# Patient Record
Sex: FEMALE | Race: WHITE | NOT HISPANIC OR LATINO | URBAN - METROPOLITAN AREA
[De-identification: names, ages, dates, MRNs, and addresses within clinical notes are randomized per-mention and may not be internally consistent; named-entity substitution may affect disease eponyms.]

---

## 2018-11-19 ENCOUNTER — HOSPITAL ENCOUNTER (OUTPATIENT)
Dept: RADIOLOGY | Facility: HOSPITAL | Age: 58
Discharge: HOME | End: 2018-11-19
Attending: INTERNAL MEDICINE
Payer: MEDICARE

## 2018-11-19 DIAGNOSIS — K50.90 CROHN'S DISEASE: ICD-10-CM

## 2018-11-19 PROCEDURE — 72197 MRI PELVIS W/O & W/DYE: CPT | Mod: TC

## 2018-11-19 PROCEDURE — 25500020 PHARM REV CODE 255

## 2018-11-19 PROCEDURE — 74183 MRI ABD W/O CNTR FLWD CNTR: CPT | Mod: 26,,, | Performed by: RADIOLOGY

## 2018-11-19 PROCEDURE — 72197 MRI PELVIS W/O & W/DYE: CPT | Mod: 26,,, | Performed by: RADIOLOGY

## 2018-11-19 PROCEDURE — A9585 GADOBUTROL INJECTION: HCPCS

## 2018-11-19 RX ORDER — GADOBUTROL 604.72 MG/ML
10 INJECTION INTRAVENOUS
Status: COMPLETED | OUTPATIENT
Start: 2018-11-19 | End: 2018-11-19

## 2018-11-19 RX ADMIN — GADOBUTROL 10 ML: 604.72 INJECTION INTRAVENOUS at 11:11

## 2025-02-02 ENCOUNTER — HOSPITAL ENCOUNTER (INPATIENT)
Facility: HOSPITAL | Age: 65
LOS: 9 days | Discharge: HOME | DRG: 885 | End: 2025-02-11
Attending: PSYCHIATRY & NEUROLOGY | Admitting: PSYCHIATRY & NEUROLOGY
Payer: MEDICARE

## 2025-02-02 ENCOUNTER — HOSPITAL ENCOUNTER (EMERGENCY)
Facility: HOSPITAL | Age: 65
Discharge: PSYCHIATRIC OR SUBSTANCE USE FACILITY | End: 2025-02-02
Attending: STUDENT IN AN ORGANIZED HEALTH CARE EDUCATION/TRAINING PROGRAM
Payer: MEDICARE

## 2025-02-02 VITALS
RESPIRATION RATE: 18 BRPM | HEIGHT: 64 IN | DIASTOLIC BLOOD PRESSURE: 57 MMHG | BODY MASS INDEX: 27.77 KG/M2 | SYSTOLIC BLOOD PRESSURE: 120 MMHG | WEIGHT: 162.69 LBS | HEART RATE: 85 BPM | OXYGEN SATURATION: 95 % | TEMPERATURE: 98 F

## 2025-02-02 DIAGNOSIS — Z00.8 EVALUATION BY PSYCHIATRIC SERVICE REQUIRED: Primary | ICD-10-CM

## 2025-02-02 DIAGNOSIS — F29 PSYCHOSIS: Primary | ICD-10-CM

## 2025-02-02 LAB
ALBUMIN SERPL BCP-MCNC: 4.4 G/DL (ref 3.5–5.2)
ALP SERPL-CCNC: 95 U/L (ref 40–150)
ALT SERPL W/O P-5'-P-CCNC: 15 U/L (ref 10–44)
AMPHET+METHAMPHET UR QL: NEGATIVE
ANION GAP SERPL CALC-SCNC: 13 MMOL/L (ref 8–16)
APAP SERPL-MCNC: 11.5 UG/ML (ref 10–20)
AST SERPL-CCNC: 20 U/L (ref 10–40)
BACTERIA #/AREA URNS HPF: ABNORMAL /HPF
BARBITURATES UR QL SCN>200 NG/ML: NEGATIVE
BASOPHILS # BLD AUTO: 0.05 K/UL (ref 0–0.2)
BASOPHILS NFR BLD: 0.5 % (ref 0–1.9)
BENZODIAZ UR QL SCN>200 NG/ML: NEGATIVE
BILIRUB SERPL-MCNC: 1.4 MG/DL (ref 0.1–1)
BILIRUB UR QL STRIP: ABNORMAL
BUN SERPL-MCNC: 17 MG/DL (ref 8–23)
BZE UR QL SCN: NEGATIVE
CALCIUM SERPL-MCNC: 10.5 MG/DL (ref 8.7–10.5)
CANNABINOIDS UR QL SCN: ABNORMAL
CHLORIDE SERPL-SCNC: 106 MMOL/L (ref 95–110)
CLARITY UR: CLEAR
CO2 SERPL-SCNC: 18 MMOL/L (ref 23–29)
COLOR UR: YELLOW
CREAT SERPL-MCNC: 1 MG/DL (ref 0.5–1.4)
CREAT UR-MCNC: 140.4 MG/DL (ref 15–325)
DIFFERENTIAL METHOD BLD: ABNORMAL
EOSINOPHIL # BLD AUTO: 0.1 K/UL (ref 0–0.5)
EOSINOPHIL NFR BLD: 1.1 % (ref 0–8)
ERYTHROCYTE [DISTWIDTH] IN BLOOD BY AUTOMATED COUNT: 13 % (ref 11.5–14.5)
EST. GFR  (NO RACE VARIABLE): >60 ML/MIN/1.73 M^2
ETHANOL SERPL-MCNC: <10 MG/DL
GLUCOSE SERPL-MCNC: 108 MG/DL (ref 70–110)
GLUCOSE UR QL STRIP: NEGATIVE
HCT VFR BLD AUTO: 41.8 % (ref 37–48.5)
HGB BLD-MCNC: 14.4 G/DL (ref 12–16)
HGB UR QL STRIP: NEGATIVE
HYALINE CASTS #/AREA URNS LPF: 25 /LPF
IMM GRANULOCYTES # BLD AUTO: 0.04 K/UL (ref 0–0.04)
IMM GRANULOCYTES NFR BLD AUTO: 0.4 % (ref 0–0.5)
KETONES UR QL STRIP: ABNORMAL
LEUKOCYTE ESTERASE UR QL STRIP: ABNORMAL
LYMPHOCYTES # BLD AUTO: 2.9 K/UL (ref 1–4.8)
LYMPHOCYTES NFR BLD: 26.8 % (ref 18–48)
MCH RBC QN AUTO: 33 PG (ref 27–31)
MCHC RBC AUTO-ENTMCNC: 34.4 G/DL (ref 32–36)
MCV RBC AUTO: 96 FL (ref 82–98)
METHADONE UR QL SCN>300 NG/ML: NEGATIVE
MICROSCOPIC COMMENT: ABNORMAL
MONOCYTES # BLD AUTO: 0.6 K/UL (ref 0.3–1)
MONOCYTES NFR BLD: 5.8 % (ref 4–15)
NEUTROPHILS # BLD AUTO: 7.1 K/UL (ref 1.8–7.7)
NEUTROPHILS NFR BLD: 65.4 % (ref 38–73)
NITRITE UR QL STRIP: NEGATIVE
NRBC BLD-RTO: 0 /100 WBC
OPIATES UR QL SCN: NEGATIVE
PCP UR QL SCN>25 NG/ML: NEGATIVE
PH UR STRIP: 6 [PH] (ref 5–8)
PLATELET # BLD AUTO: 209 K/UL (ref 150–450)
PMV BLD AUTO: 8.6 FL (ref 9.2–12.9)
POTASSIUM SERPL-SCNC: 4.1 MMOL/L (ref 3.5–5.1)
PROT SERPL-MCNC: 7.9 G/DL (ref 6–8.4)
PROT UR QL STRIP: ABNORMAL
RBC # BLD AUTO: 4.36 M/UL (ref 4–5.4)
RBC #/AREA URNS HPF: 2 /HPF (ref 0–4)
SODIUM SERPL-SCNC: 137 MMOL/L (ref 136–145)
SP GR UR STRIP: 1.02 (ref 1–1.03)
SQUAMOUS #/AREA URNS HPF: 15 /HPF
TOXICOLOGY INFORMATION: ABNORMAL
URN SPEC COLLECT METH UR: ABNORMAL
UROBILINOGEN UR STRIP-ACNC: NEGATIVE EU/DL
WBC # BLD AUTO: 10.82 K/UL (ref 3.9–12.7)
WBC #/AREA URNS HPF: 2 /HPF (ref 0–5)
YEAST URNS QL MICRO: ABNORMAL

## 2025-02-02 PROCEDURE — 85025 COMPLETE CBC W/AUTO DIFF WBC: CPT | Performed by: STUDENT IN AN ORGANIZED HEALTH CARE EDUCATION/TRAINING PROGRAM

## 2025-02-02 PROCEDURE — 82077 ASSAY SPEC XCP UR&BREATH IA: CPT | Performed by: STUDENT IN AN ORGANIZED HEALTH CARE EDUCATION/TRAINING PROGRAM

## 2025-02-02 PROCEDURE — 80307 DRUG TEST PRSMV CHEM ANLYZR: CPT | Performed by: STUDENT IN AN ORGANIZED HEALTH CARE EDUCATION/TRAINING PROGRAM

## 2025-02-02 PROCEDURE — 80053 COMPREHEN METABOLIC PANEL: CPT | Performed by: STUDENT IN AN ORGANIZED HEALTH CARE EDUCATION/TRAINING PROGRAM

## 2025-02-02 PROCEDURE — 11400000 HC PSYCH PRIVATE ROOM

## 2025-02-02 PROCEDURE — 80143 DRUG ASSAY ACETAMINOPHEN: CPT | Performed by: STUDENT IN AN ORGANIZED HEALTH CARE EDUCATION/TRAINING PROGRAM

## 2025-02-02 PROCEDURE — 81000 URINALYSIS NONAUTO W/SCOPE: CPT | Mod: 59 | Performed by: STUDENT IN AN ORGANIZED HEALTH CARE EDUCATION/TRAINING PROGRAM

## 2025-02-02 PROCEDURE — 99285 EMERGENCY DEPT VISIT HI MDM: CPT

## 2025-02-02 RX ORDER — LORAZEPAM 2 MG/ML
2 INJECTION INTRAMUSCULAR EVERY 4 HOURS PRN
Status: DISCONTINUED | OUTPATIENT
Start: 2025-02-02 | End: 2025-02-02 | Stop reason: HOSPADM

## 2025-02-02 RX ORDER — DIPHENHYDRAMINE HYDROCHLORIDE 50 MG/ML
50 INJECTION INTRAMUSCULAR; INTRAVENOUS EVERY 6 HOURS PRN
Status: DISCONTINUED | OUTPATIENT
Start: 2025-02-02 | End: 2025-02-02 | Stop reason: HOSPADM

## 2025-02-02 RX ORDER — HALOPERIDOL 5 MG/ML
5 INJECTION INTRAMUSCULAR EVERY 6 HOURS PRN
Status: DISCONTINUED | OUTPATIENT
Start: 2025-02-02 | End: 2025-02-02 | Stop reason: HOSPADM

## 2025-02-02 RX ORDER — LORAZEPAM 1 MG/1
1 TABLET ORAL
Status: DISCONTINUED | OUTPATIENT
Start: 2025-02-02 | End: 2025-02-02 | Stop reason: HOSPADM

## 2025-02-02 NOTE — ED PROVIDER NOTES
Encounter Date: 2/2/2025       History     Chief Complaint   Patient presents with    Anxiety     Pt arrives to the ed with sister in law to the ed with c/o visual hallucinations, bizarre behavior, anxiety, and insomnia x 1 month. Pt reports seeing ghost but knows they aren't real. Hx bipolar.denies  SI/HI     64-year-old female with history of bipolar, Crohn's disease, presenting requesting help with her mental health.  Patient reports severe anxiety over the last few days, reports visual hallucinations, seeing dose (but knows that they are not real), and other bizarre behavior.  No other complaints.  No suicidal homicidal ideation.      Review of patient's allergies indicates:  No Known Allergies  Past Medical History:   Diagnosis Date    Anxiety disorder, unspecified     Bipolar disorder, unspecified     Crohn disease     Hypertension      History reviewed. No pertinent surgical history.  No family history on file.  Social History     Tobacco Use    Smoking status: Every Day     Types: Cigarettes    Smokeless tobacco: Never   Substance Use Topics    Alcohol use: Never    Drug use: Yes     Types: Marijuana     Review of Systems   Constitutional:  Negative for fever.   HENT:  Negative for sore throat.    Respiratory:  Negative for shortness of breath.    Cardiovascular:  Negative for chest pain.   Gastrointestinal:  Negative for nausea.   Genitourinary:  Negative for dysuria.   Musculoskeletal:  Negative for back pain.   Skin:  Negative for rash.   Neurological:  Negative for weakness.   Hematological:  Does not bruise/bleed easily.   Psychiatric/Behavioral:  Positive for behavioral problems.        Physical Exam     Initial Vitals [02/02/25 1730]   BP Pulse Resp Temp SpO2   (!) 184/84 83 20 98.5 °F (36.9 °C) 97 %      MAP       --         Physical Exam    Nursing note and vitals reviewed.  Constitutional: She appears well-developed.   HENT:   Head: Normocephalic.   Eyes: Pupils are equal, round, and reactive to  light.   Neck:   Normal range of motion.  Cardiovascular:            No murmur heard.  Pulmonary/Chest: No respiratory distress.   Abdominal: Abdomen is soft.   Musculoskeletal:         General: No edema.      Cervical back: Normal range of motion.     Neurological: She is alert.   Skin: Skin is warm.   Psychiatric:   No suicidal homicidal ideation.  Patient is having visual hallucinations, and bizarre behavior         ED Course   Procedures  Labs Reviewed   CBC W/ AUTO DIFFERENTIAL   COMPREHENSIVE METABOLIC PANEL   URINALYSIS, REFLEX TO URINE CULTURE   DRUG SCREEN PANEL, URINE EMERGENCY   ALCOHOL,MEDICAL (ETHANOL)   ACETAMINOPHEN LEVEL          Imaging Results    None          Medications   diphenhydrAMINE injection 50 mg (has no administration in time range)   haloperidol lactate injection 5 mg (has no administration in time range)   LORazepam injection 2 mg (has no administration in time range)   LORazepam tablet 1 mg (has no administration in time range)     Medical Decision Making  DDX:  Patient presenting with decompensated psychiatric condition.  Low suspicion for acute medical pathology.  DX:  Psych labs  TX:  Psych consult  Dispo:  Pending psych evaluation      Amount and/or Complexity of Data Reviewed  Labs: ordered.    Risk  Prescription drug management.                                      Clinical Impression:  Final diagnoses:  [Z00.8] Evaluation by psychiatric service required (Primary)                 Emmanuel Abraham MD  02/02/25 8013

## 2025-02-03 PROBLEM — F29 PSYCHOSIS: Status: ACTIVE | Noted: 2025-02-03

## 2025-02-03 PROBLEM — R44.1 VISUAL HALLUCINATION: Status: ACTIVE | Noted: 2025-02-03

## 2025-02-03 LAB
CHOLEST SERPL-MCNC: 112 MG/DL (ref 120–199)
CHOLEST/HDLC SERPL: 2.7 {RATIO} (ref 2–5)
ESTIMATED AVG GLUCOSE: 97 MG/DL (ref 68–131)
HBA1C MFR BLD: 5 % (ref 4–5.6)
HDLC SERPL-MCNC: 42 MG/DL (ref 40–75)
HDLC SERPL: 37.5 % (ref 20–50)
LDLC SERPL CALC-MCNC: 48.8 MG/DL (ref 63–159)
NONHDLC SERPL-MCNC: 70 MG/DL
TRIGL SERPL-MCNC: 106 MG/DL (ref 30–150)

## 2025-02-03 PROCEDURE — 83036 HEMOGLOBIN GLYCOSYLATED A1C: CPT | Performed by: PSYCHIATRY & NEUROLOGY

## 2025-02-03 PROCEDURE — 25000003 PHARM REV CODE 250: Performed by: PSYCHIATRY & NEUROLOGY

## 2025-02-03 PROCEDURE — 11400000 HC PSYCH PRIVATE ROOM

## 2025-02-03 PROCEDURE — 80061 LIPID PANEL: CPT | Performed by: PSYCHIATRY & NEUROLOGY

## 2025-02-03 PROCEDURE — 36415 COLL VENOUS BLD VENIPUNCTURE: CPT | Performed by: PSYCHIATRY & NEUROLOGY

## 2025-02-03 RX ORDER — ONDANSETRON 4 MG/1
4 TABLET, ORALLY DISINTEGRATING ORAL EVERY 8 HOURS PRN
Status: DISCONTINUED | OUTPATIENT
Start: 2025-02-03 | End: 2025-02-11 | Stop reason: HOSPADM

## 2025-02-03 RX ORDER — OLANZAPINE 10 MG/1
10 TABLET ORAL EVERY 8 HOURS PRN
Status: DISCONTINUED | OUTPATIENT
Start: 2025-02-03 | End: 2025-02-11 | Stop reason: HOSPADM

## 2025-02-03 RX ORDER — BENZONATATE 100 MG/1
100 CAPSULE ORAL 3 TIMES DAILY PRN
Status: DISCONTINUED | OUTPATIENT
Start: 2025-02-03 | End: 2025-02-11 | Stop reason: HOSPADM

## 2025-02-03 RX ORDER — OLANZAPINE 10 MG/2ML
10 INJECTION, POWDER, FOR SOLUTION INTRAMUSCULAR EVERY 8 HOURS PRN
Status: DISCONTINUED | OUTPATIENT
Start: 2025-02-03 | End: 2025-02-11 | Stop reason: HOSPADM

## 2025-02-03 RX ORDER — HYDROXYZINE PAMOATE 50 MG/1
50 CAPSULE ORAL EVERY 6 HOURS PRN
Status: DISCONTINUED | OUTPATIENT
Start: 2025-02-03 | End: 2025-02-11 | Stop reason: HOSPADM

## 2025-02-03 RX ORDER — METOPROLOL TARTRATE 25 MG/1
25 TABLET, FILM COATED ORAL 2 TIMES DAILY
COMMUNITY
Start: 2025-01-04

## 2025-02-03 RX ORDER — DULOXETIN HYDROCHLORIDE 60 MG/1
60 CAPSULE, DELAYED RELEASE ORAL DAILY
Status: ON HOLD | COMMUNITY
Start: 2024-11-18 | End: 2025-02-03 | Stop reason: ALTCHOICE

## 2025-02-03 RX ORDER — QUETIAPINE FUMARATE 25 MG/1
25 TABLET, FILM COATED ORAL 2 TIMES DAILY
Status: DISCONTINUED | OUTPATIENT
Start: 2025-02-03 | End: 2025-02-04

## 2025-02-03 RX ORDER — ALUMINUM HYDROXIDE, MAGNESIUM HYDROXIDE, AND SIMETHICONE 1200; 120; 1200 MG/30ML; MG/30ML; MG/30ML
30 SUSPENSION ORAL EVERY 6 HOURS PRN
Status: DISCONTINUED | OUTPATIENT
Start: 2025-02-03 | End: 2025-02-11 | Stop reason: HOSPADM

## 2025-02-03 RX ORDER — DICYCLOMINE HYDROCHLORIDE 20 MG/1
20 TABLET ORAL 3 TIMES DAILY
COMMUNITY
Start: 2025-01-28

## 2025-02-03 RX ORDER — ROPINIROLE 0.5 MG/1
0.5 TABLET, FILM COATED ORAL NIGHTLY
COMMUNITY
Start: 2025-01-23

## 2025-02-03 RX ORDER — ACETAMINOPHEN 325 MG/1
650 TABLET ORAL EVERY 6 HOURS PRN
Status: DISCONTINUED | OUTPATIENT
Start: 2025-02-03 | End: 2025-02-11 | Stop reason: HOSPADM

## 2025-02-03 RX ORDER — POTASSIUM CHLORIDE 750 MG/1
10 TABLET, EXTENDED RELEASE ORAL 2 TIMES DAILY
Status: ON HOLD | COMMUNITY
End: 2025-02-11 | Stop reason: HOSPADM

## 2025-02-03 RX ORDER — LOPERAMIDE HYDROCHLORIDE 2 MG/1
2 CAPSULE ORAL
Status: DISCONTINUED | OUTPATIENT
Start: 2025-02-03 | End: 2025-02-11 | Stop reason: HOSPADM

## 2025-02-03 RX ORDER — ESCITALOPRAM OXALATE 5 MG/1
5 TABLET ORAL DAILY
Status: DISCONTINUED | OUTPATIENT
Start: 2025-02-03 | End: 2025-02-04

## 2025-02-03 RX ORDER — IBUPROFEN 200 MG
1 TABLET ORAL DAILY PRN
Status: DISCONTINUED | OUTPATIENT
Start: 2025-02-03 | End: 2025-02-11 | Stop reason: HOSPADM

## 2025-02-03 RX ORDER — PROMETHAZINE HYDROCHLORIDE 25 MG/1
25 TABLET ORAL EVERY 6 HOURS PRN
Status: DISCONTINUED | OUTPATIENT
Start: 2025-02-03 | End: 2025-02-11 | Stop reason: HOSPADM

## 2025-02-03 RX ORDER — CARIPRAZINE 1.5 MG/1
1.5 CAPSULE, GELATIN COATED ORAL
Status: ON HOLD | COMMUNITY
End: 2025-02-03 | Stop reason: ALTCHOICE

## 2025-02-03 RX ORDER — BENZTROPINE MESYLATE 1 MG/ML
2 INJECTION, SOLUTION INTRAMUSCULAR; INTRAVENOUS EVERY 8 HOURS PRN
Status: DISCONTINUED | OUTPATIENT
Start: 2025-02-03 | End: 2025-02-11 | Stop reason: HOSPADM

## 2025-02-03 RX ADMIN — ACETAMINOPHEN 650 MG: 325 TABLET ORAL at 04:02

## 2025-02-03 RX ADMIN — ESCITALOPRAM OXALATE 5 MG: 5 TABLET, FILM COATED ORAL at 09:02

## 2025-02-03 RX ADMIN — QUETIAPINE FUMARATE 25 MG: 25 TABLET ORAL at 08:02

## 2025-02-03 RX ADMIN — QUETIAPINE FUMARATE 25 MG: 25 TABLET ORAL at 09:02

## 2025-02-03 RX ADMIN — HYDROXYZINE PAMOATE 50 MG: 50 CAPSULE ORAL at 01:02

## 2025-02-03 RX ADMIN — ACETAMINOPHEN 650 MG: 325 TABLET ORAL at 01:02

## 2025-02-03 NOTE — PLAN OF CARE
64 yr old wf transferred from [place] for psych eval and was brought there by her sister n law.  Pt escorted to [facility] via x2 security and x2 aasi. Proscan performed with neg results. Security and MHT assisted pt with change of scrubs and no neg results. Pt hx of bipolar, anxiety, hypertension, and crohn dz and states she is med compliant. Pt c/o severe anxiety and visual hallucinations seeing her dead brother and grandmother very clear smiling. No other complaints.Pt says it hurts and she is tearful. Pt states she know they are not real. + thc. Pt cooperative.

## 2025-02-03 NOTE — NURSING
Security called to unit.  While inventorying pt's belongings and medications, a pill bottle containing two partially smoked hand-rolled cigarettes.  Security took control of unknown substance and informed this staff member that [facility] would be notified of possible controlled substance.

## 2025-02-03 NOTE — PROVIDER PROGRESS NOTES - EMERGENCY DEPT.
"ED HAND-OFF NOTE:  2/2/2025 Received Sign Out    Chief Complaint   Patient presents with    Anxiety     Pt arrives to the ed with sister in law to the ed with c/o visual hallucinations, bizarre behavior, anxiety, and insomnia x 1 month. Pt reports seeing ghost but knows they aren't real. Hx bipolar.denies  SI/HI     Charline Greenfield is a 64 y.o. female who presented to the ED on 2/2/2025 with chief complaint of anxiety. I assumed care of patient from off-going ED physician team pending medical clearance.    On my evaluation, Charline Greenfield appears well, hemodynamically stable and in NAD. Thus far, Charline Greenfield has received:  Medications   diphenhydrAMINE injection 50 mg (has no administration in time range)   haloperidol lactate injection 5 mg (has no administration in time range)   LORazepam injection 2 mg (has no administration in time range)   LORazepam tablet 1 mg (has no administration in time range)     Vital Signs:  BP (!) 184/84   Pulse 83   Temp 98.5 °F (36.9 °C)   Resp 20   Ht 5' 4" (1.626 m)   Wt 73.8 kg (162 lb 11.2 oz)   SpO2 97%   Breastfeeding No   BMI 27.93 kg/m²     Laboratory Studies:  Labs Reviewed   CBC W/ AUTO DIFFERENTIAL - Abnormal       Result Value    WBC 10.82      RBC 4.36      Hemoglobin 14.4      Hematocrit 41.8      MCV 96      MCH 33.0 (*)     MCHC 34.4      RDW 13.0      Platelets 209      MPV 8.6 (*)     Immature Granulocytes 0.4      Gran # (ANC) 7.1      Immature Grans (Abs) 0.04      Lymph # 2.9      Mono # 0.6      Eos # 0.1      Baso # 0.05      nRBC 0      Gran % 65.4      Lymph % 26.8      Mono % 5.8      Eosinophil % 1.1      Basophil % 0.5      Differential Method Automated     COMPREHENSIVE METABOLIC PANEL - Abnormal    Sodium 137      Potassium 4.1      Chloride 106      CO2 18 (*)     Glucose 108      BUN 17      Creatinine 1.0      Calcium 10.5      Total Protein 7.9      Albumin 4.4      Total Bilirubin 1.4 (*)     Alkaline Phosphatase 95      AST 20   "    ALT 15      eGFR >60      Anion Gap 13     URINALYSIS, REFLEX TO URINE CULTURE - Abnormal    Specimen UA Urine, Clean Catch      Color, UA Yellow      Appearance, UA Clear      pH, UA 6.0      Specific Gravity, UA 1.025      Protein, UA Trace (*)     Glucose, UA Negative      Ketones, UA 1+ (*)     Bilirubin (UA) 1+ (*)     Occult Blood UA Negative      Nitrite, UA Negative      Urobilinogen, UA Negative      Leukocytes, UA Trace (*)     Narrative:     Specimen Source->Urine   DRUG SCREEN PANEL, URINE EMERGENCY - Abnormal    Benzodiazepines Negative      Methadone metabolites Negative      Cocaine (Metab.) Negative      Opiate Scrn, Ur Negative      Barbiturate Screen, Ur Negative      Amphetamine Screen, Ur Negative      THC Presumptive Positive (*)     Phencyclidine Negative      Creatinine, Urine 140.4      Toxicology Information SEE COMMENT      Narrative:     Specimen Source->Urine   URINALYSIS MICROSCOPIC - Abnormal    RBC, UA 2      WBC, UA 2      Bacteria Few (*)     Yeast, UA Rare (*)     Squam Epithel, UA 15      Hyaline Casts, UA 25 (*)     Microscopic Comment SEE COMMENT      Narrative:     Specimen Source->Urine   ALCOHOL,MEDICAL (ETHANOL)    Alcohol, Serum <10     ACETAMINOPHEN LEVEL    Acetaminophen (Tylenol), Serum 11.5       MDM:  Patient is signed out pending labs for medical clearance for psychiatric treatment.  Labs without significant abnormality.  Patient underwent a work up in the emergency department including labs, no acute medical cause of the patient's current psychiatric illness has been identified at this time. Patient medically cleared for psychiatric evaluation.     Diagnostic Impression:    Final diagnoses:  [Z00.8] Evaluation by psychiatric service required (Primary)     ED Disposition Condition    Transfer to Psych Facility Stable          ______________________  Emerson Mendez MD  Emergency Medicine   2/2/2025

## 2025-02-03 NOTE — H&P
[facility]) Hospital Medicine  History & Physical    Patient Name: Charline Greenfield  MRN: [id number]  Patient Class: IP- Psych  Admission Date: 2/2/2025  Attending Physician: Petey Ayoub MD   Primary Care Provider: Mamadou Nair MD         Patient information was obtained from patient, past medical records, and ER records.     Subjective:     Principal Problem:Psychosis    Chief Complaint: No chief complaint on file.       HPI:     ED HAND-OFF NOTE:  2/2/2025 Received Sign Out          Chief Complaint   Patient presents with    Anxiety       Pt arrives to the ed with sister in law to the ed with c/o visual hallucinations, bizarre behavior, anxiety, and insomnia x 1 month. Pt reports seeing ghost but knows they aren't real. Hx bipolar.denies  SI/HI      Charline Greenfield is a 64 y.o. female who presented to the ED on 2/2/2025 with chief complaint of anxiety. I assumed care of patient from off-going ED physician team pending medical clearance.     On my evaluation, Charline Greenfield appears well, hemodynamically stable and in NAD. Thus far, Charline Greenfield has received:        2/03/2025: Patient presents for complaints of visual hallucinations with bizarre behavior.  Patient has been seeing goes for the past month.  Recommended inpatient psychiatric treatment.    Past Medical History:   Diagnosis Date    Anxiety disorder, unspecified     Bipolar disorder, unspecified     Crohn disease     Hypertension        No past surgical history on file.    Review of patient's allergies indicates:  No Known Allergies    Current Facility-Administered Medications on File Prior to Encounter   Medication    [DISCONTINUED] diphenhydrAMINE injection 50 mg    [DISCONTINUED] haloperidol lactate injection 5 mg    [DISCONTINUED] LORazepam injection 2 mg    [DISCONTINUED] LORazepam tablet 1 mg     Current Outpatient Medications on File Prior to Encounter   Medication Sig    dicyclomine (BENTYL)  20 mg tablet Take 20 mg by mouth 3 (three) times daily.    metoprolol tartrate (LOPRESSOR) 25 MG tablet Take 25 mg by mouth 2 (two) times daily.    rOPINIRole (REQUIP) 0.5 MG tablet Take 0.5 mg by mouth every evening.    [DISCONTINUED] DULoxetine (CYMBALTA) 60 MG capsule Take 60 mg by mouth once daily.    potassium chloride (KLOR-CON) 10 MEQ TbSR Take 10 mEq by mouth 2 (two) times daily.    [DISCONTINUED] cariprazine (VRAYLAR) 1.5 mg Cap Take 1.5 mg by mouth.     Family History    None       Tobacco Use    Smoking status: Every Day     Types: Cigarettes    Smokeless tobacco: Never   Substance and Sexual Activity    Alcohol use: Never    Drug use: Yes     Types: Marijuana    Sexual activity: Not Currently     Review of Systems   Constitutional:  Negative for chills, diaphoresis, fatigue and fever.   HENT:  Negative for congestion, facial swelling and trouble swallowing.    Eyes: Negative.    Respiratory:  Negative for cough, chest tightness, shortness of breath and wheezing.    Cardiovascular:  Negative for chest pain, palpitations and leg swelling.   Gastrointestinal:  Negative for abdominal distention, abdominal pain, blood in stool, diarrhea, nausea and vomiting.   Endocrine: Negative.    Genitourinary: Negative.    Musculoskeletal:  Negative for arthralgias and gait problem.   Allergic/Immunologic: Negative.    Neurological:  Negative for dizziness, syncope and light-headedness.   Hematological: Negative.    Psychiatric/Behavioral:  Positive for behavioral problems, dysphoric mood, hallucinations and sleep disturbance.      Objective:     Vital Signs (Most Recent):  Temp: 98.1 °F (36.7 °C) (02/03/25 0739)  Pulse: (!) 112 (02/03/25 0739)  Resp: 20 (02/03/25 0739)  BP: 126/66 (02/03/25 0739)  SpO2: 98 % (02/03/25 0739) Vital Signs (24h Range):  Temp:  [98.1 °F (36.7 °C)-99.9 °F (37.7 °C)] 98.1 °F (36.7 °C)  Pulse:  [] 112  Resp:  [18-20] 20  SpO2:  [95 %-98 %] 98 %  BP: (102-184)/(57-84) 126/66     Weight:  73.8 kg (162 lb 11.2 oz)  Body mass index is 27.93 kg/m².     Physical Exam  Vitals and nursing note reviewed.   Constitutional:       Appearance: Normal appearance.   HENT:      Head: Normocephalic and atraumatic.      Nose: Nose normal.      Mouth/Throat:      Mouth: Mucous membranes are moist.      Pharynx: Oropharynx is clear.   Eyes:      Extraocular Movements: Extraocular movements intact.      Conjunctiva/sclera: Conjunctivae normal.      Pupils: Pupils are equal, round, and reactive to light.   Cardiovascular:      Rate and Rhythm: Normal rate and regular rhythm.      Pulses: Normal pulses.      Heart sounds: Normal heart sounds.   Pulmonary:      Effort: Pulmonary effort is normal.      Breath sounds: Normal breath sounds.   Abdominal:      General: Abdomen is flat. Bowel sounds are normal.      Palpations: Abdomen is soft.   Musculoskeletal:         General: Normal range of motion.      Cervical back: Normal range of motion and neck supple.   Skin:     General: Skin is warm and dry.      Capillary Refill: Capillary refill takes less than 2 seconds.      Comments: No rashes on limited skin exam.   Neurological:      General: No focal deficit present.      Mental Status: She is alert and oriented to person, place, and time.      Cranial Nerves: No cranial nerve deficit.      Comments: I Olfactory:  Sense of smell intact    II Optic:  Pupils equal round react to light.  Vision intact.    III, IV, VI, Ocular motor, Trochlear, Abducens:  Extraocular movements intact    V Trigeminal:  Facial sensation intact facial sensation intact,, muscles of mastication intact muscles of mastication intact, corneal reflex intact, corneal reflex intact    VII Facial:  Muscles of facial expression intact     VIII Vestibular cochlear: Hearing intact vestibular cochlear: Hearing intact    IX Glossopharyngeal:  Gag reflex intact.  Tasting intact.     X Vagus:  Gag reflex intact.    XI Spinal Accessory:  Shoulder shrug intact.   Head rotation intact.    XII Hypoglossal:  Tongue movements intact.     Psychiatric:         Attention and Perception: She perceives visual hallucinations.         Mood and Affect: Mood is depressed. Affect is inappropriate.         Cognition and Memory: Cognition is impaired.      Comments: Patient appears depressed              CRANIAL NERVES     CN III, IV, VI   Pupils are equal, round, and reactive to light.       Significant Labs: All pertinent labs within the past 24 hours have been reviewed.    Significant Imaging: I have reviewed all pertinent imaging results/findings within the past 24 hours.  Assessment/Plan:     * Psychosis    2/3/25:  Continue treatment per inpatient psych team recommendations.     Visual hallucination    2/03/2025:  Continue treatment per inpatient psych team recommendations.       VTE Risk Mitigation (From admission, onward)      None                            AR Love  Department of Hospital Medicine  [facility] ([facility])

## 2025-02-03 NOTE — CONSULTS
"  [facility]  Adult Nutrition  Consult Note    SUMMARY     Recommendations  1. Rec'd General Healthful diet   2. Rec'd limit high fat, high sugar, fried, and processed foods.  Goals:   1. Pt will continue to consume > 75% of meals for remainder of hospital stay.  Nutrition Goal Status: new  Communication of RD Recs: reviewed with RN    Assessment and Plan    Nutrition Problem  No nutrition related dx at this time.    Malnutrition Assessment  No NFPE- Pt is PEC/CEC.    Nutrition Related Social Determinants of Health:  Unable to assess    Reason for Assessment    Reason For Assessment: consult (Admit)  Diagnosis: psychological disorder (Psychosis)  General Information Comments: RD cosnulted for new admit. Noted pt's hx of Chron's disease. Attempted to speak with pt about appetite and food preferences. Pt was contradictory with responses. As per RN pt's appetite is adequate. No other nutrition/dietary issues at this time. RD to sign off. Please re-consult if any nutrition/dietary issues arise.  Nutrition Discharge Planning: General Healthful diet    Nutrition/Diet History    Nutrition Intake History: Regular diet  Food Preferences: None  Spiritual, Cultural Beliefs, Islam Practices, Values that Affect Care: no  Food Allergies: NKFA  Factors Affecting Nutritional Intake: None identified at this time    Anthropometrics    Height: 5' 4" (162.6 cm)  Height (inches): 64 in  Height Method: Measured  Weight: 73.8 kg (162 lb 11.2 oz)  Weight (lb): 162.7 lb  Weight Method: Standard Scale  Ideal Body Weight (IBW), Female: 120 lb  % Ideal Body Weight, Female (lb): 135.58 %  BMI (Calculated): 27.9  BMI Grade: 25 - 29.9 - overweight    Lab/Procedures/Meds    Pertinent Labs Reviewed: reviewed  Pertinent Medications Reviewed: reviewed    Estimated/Assessed Needs    Weight Used For Calorie Calculations: 73.8 kg (162 lb 11.2 oz)  Energy Calorie Requirements (kcal): 9669-7630 (20-25 kcal/kg)  Energy Need " Method: Kcal/kg  Protein Requirements: 59-73 (0.8-1.0g/kg)  Weight Used For Protein Calculations: 73.8 kg (162 lb 11.2 oz)  Fluid Requirements (mL): 8452-7561 (1mL/kcal)  Estimated Fluid Requirement Method: RDA Method  RDA Method (mL): 1476    Nutrition Prescription Ordered    Current Diet Order: Regular  Oral Nutrition Supplement: None    Evaluation of Received Nutrient/Fluid Intake    % Kcal Needs: >/= 75%  % Protein Needs: >/= 75%  I/O: SHANTI  Energy Calories Required: meeting needs  Protein Required: meeting needs  Tolerance: tolerating  % Intake of Estimated Energy Needs: 75 - 100 %  % Meal Intake: 75 - 100 %    Nutrition Risk    Level of Risk/Frequency of Follow-up: not at risk     Monitor and Evaluation    Food and Nutrient Intake: energy intake, food and beverage intake  Food and Nutrient Adminstration: diet order  Knowledge/Beliefs/Attitudes: food and nutrition knowledge/skill, beliefs and attitudes  Physical Activity and Function: nutrition-related ADLs and IADLs  Anthropometric Measurements: height/length, weight, weight change, body mass index  Biochemical Data, Medical Tests and Procedures: electrolyte and renal panel, gastrointestinal profile, glucose/endocrine profile, inflammatory profile, lipid profile  Nutrition-Focused Physical Findings: overall appearance     Nutrition Follow-Up    RD Follow-up?: No

## 2025-02-03 NOTE — HPI
ED HAND-OFF NOTE:  2/2/2025 Received Sign Out          Chief Complaint   Patient presents with    Anxiety       Pt arrives to the ed with sister in law to the ed with c/o visual hallucinations, bizarre behavior, anxiety, and insomnia x 1 month. Pt reports seeing ghost but knows they aren't real. Hx bipolar.denies  SI/HI      Charline Greenfield is a 64 y.o. female who presented to the ED on 2/2/2025 with chief complaint of anxiety. I assumed care of patient from off-going ED physician team pending medical clearance.     On my evaluation, Charline Greenfield appears well, hemodynamically stable and in NAD. Thus far, Charline Greenfield has received:        2/03/2025: Patient presents for complaints of visual hallucinations with bizarre behavior.  Patient has been seeing goes for the past month.  Recommended inpatient psychiatric treatment.

## 2025-02-03 NOTE — PLAN OF CARE
Recommendations  1. Rec'd General Healthful diet   2. Rec'd limit high fat, high sugar, fried, and processed foods.  Goals:   1. Pt will continue to consume > 75% of meals for remainder of hospital stay.  Nutrition Goal Status: new

## 2025-02-03 NOTE — NURSING
Patient in her room most of this shift, quiet, calm and withdrawn. Cooperative with staff. Compliant with medication. Patient complaining of flank pain, received prn pain medication as ordered, see emar for dosages. Patient declines to participate in group session. Minimal interaction with peers on unit. Fair appetite. Denies any homicidal or suicidal thoughts at this time. Reports no symptoms of anxiety or depression. No distress noted. No behavioral concerns at this time. Plan of care reviewed and ongoing.

## 2025-02-03 NOTE — SUBJECTIVE & OBJECTIVE
Past Medical History:   Diagnosis Date    Anxiety disorder, unspecified     Bipolar disorder, unspecified     Crohn disease     Hypertension        No past surgical history on file.    Review of patient's allergies indicates:  No Known Allergies    Current Facility-Administered Medications on File Prior to Encounter   Medication    [DISCONTINUED] diphenhydrAMINE injection 50 mg    [DISCONTINUED] haloperidol lactate injection 5 mg    [DISCONTINUED] LORazepam injection 2 mg    [DISCONTINUED] LORazepam tablet 1 mg     Current Outpatient Medications on File Prior to Encounter   Medication Sig    dicyclomine (BENTYL) 20 mg tablet Take 20 mg by mouth 3 (three) times daily.    metoprolol tartrate (LOPRESSOR) 25 MG tablet Take 25 mg by mouth 2 (two) times daily.    rOPINIRole (REQUIP) 0.5 MG tablet Take 0.5 mg by mouth every evening.    [DISCONTINUED] DULoxetine (CYMBALTA) 60 MG capsule Take 60 mg by mouth once daily.    potassium chloride (KLOR-CON) 10 MEQ TbSR Take 10 mEq by mouth 2 (two) times daily.    [DISCONTINUED] cariprazine (VRAYLAR) 1.5 mg Cap Take 1.5 mg by mouth.     Family History    None       Tobacco Use    Smoking status: Every Day     Types: Cigarettes    Smokeless tobacco: Never   Substance and Sexual Activity    Alcohol use: Never    Drug use: Yes     Types: Marijuana    Sexual activity: Not Currently     Review of Systems   Constitutional:  Negative for chills, diaphoresis, fatigue and fever.   HENT:  Negative for congestion, facial swelling and trouble swallowing.    Eyes: Negative.    Respiratory:  Negative for cough, chest tightness, shortness of breath and wheezing.    Cardiovascular:  Negative for chest pain, palpitations and leg swelling.   Gastrointestinal:  Negative for abdominal distention, abdominal pain, blood in stool, diarrhea, nausea and vomiting.   Endocrine: Negative.    Genitourinary: Negative.    Musculoskeletal:  Negative for arthralgias and gait problem.   Allergic/Immunologic:  Negative.    Neurological:  Negative for dizziness, syncope and light-headedness.   Hematological: Negative.    Psychiatric/Behavioral:  Positive for behavioral problems, dysphoric mood, hallucinations and sleep disturbance.      Objective:     Vital Signs (Most Recent):  Temp: 98.1 °F (36.7 °C) (02/03/25 0739)  Pulse: (!) 112 (02/03/25 0739)  Resp: 20 (02/03/25 0739)  BP: 126/66 (02/03/25 0739)  SpO2: 98 % (02/03/25 0739) Vital Signs (24h Range):  Temp:  [98.1 °F (36.7 °C)-99.9 °F (37.7 °C)] 98.1 °F (36.7 °C)  Pulse:  [] 112  Resp:  [18-20] 20  SpO2:  [95 %-98 %] 98 %  BP: (102-184)/(57-84) 126/66     Weight: 73.8 kg (162 lb 11.2 oz)  Body mass index is 27.93 kg/m².     Physical Exam  Vitals and nursing note reviewed.   Constitutional:       Appearance: Normal appearance.   HENT:      Head: Normocephalic and atraumatic.      Nose: Nose normal.      Mouth/Throat:      Mouth: Mucous membranes are moist.      Pharynx: Oropharynx is clear.   Eyes:      Extraocular Movements: Extraocular movements intact.      Conjunctiva/sclera: Conjunctivae normal.      Pupils: Pupils are equal, round, and reactive to light.   Cardiovascular:      Rate and Rhythm: Normal rate and regular rhythm.      Pulses: Normal pulses.      Heart sounds: Normal heart sounds.   Pulmonary:      Effort: Pulmonary effort is normal.      Breath sounds: Normal breath sounds.   Abdominal:      General: Abdomen is flat. Bowel sounds are normal.      Palpations: Abdomen is soft.   Musculoskeletal:         General: Normal range of motion.      Cervical back: Normal range of motion and neck supple.   Skin:     General: Skin is warm and dry.      Capillary Refill: Capillary refill takes less than 2 seconds.      Comments: No rashes on limited skin exam.   Neurological:      General: No focal deficit present.      Mental Status: She is alert and oriented to person, place, and time.      Cranial Nerves: No cranial nerve deficit.      Comments: I  Olfactory:  Sense of smell intact    II Optic:  Pupils equal round react to light.  Vision intact.    III, IV, VI, Ocular motor, Trochlear, Abducens:  Extraocular movements intact    V Trigeminal:  Facial sensation intact facial sensation intact,, muscles of mastication intact muscles of mastication intact, corneal reflex intact, corneal reflex intact    VII Facial:  Muscles of facial expression intact     VIII Vestibular cochlear: Hearing intact vestibular cochlear: Hearing intact    IX Glossopharyngeal:  Gag reflex intact.  Tasting intact.     X Vagus:  Gag reflex intact.    XI Spinal Accessory:  Shoulder shrug intact.  Head rotation intact.    XII Hypoglossal:  Tongue movements intact.     Psychiatric:         Attention and Perception: She perceives visual hallucinations.         Mood and Affect: Mood is depressed. Affect is inappropriate.         Cognition and Memory: Cognition is impaired.      Comments: Patient appears depressed              CRANIAL NERVES     CN III, IV, VI   Pupils are equal, round, and reactive to light.       Significant Labs: All pertinent labs within the past 24 hours have been reviewed.    Significant Imaging: I have reviewed all pertinent imaging results/findings within the past 24 hours.

## 2025-02-03 NOTE — PLAN OF CARE
Problem: Adult Behavioral Health Plan of Care  Goal: Plan of Care Review  Outcome: Progressing  Goal: Patient-Specific Goal (Individualization)  Outcome: Progressing  Goal: Adheres to Safety Considerations for Self and Others  Outcome: Progressing  Goal: Absence of New-Onset Illness or Injury  Outcome: Progressing  Goal: Optimized Coping Skills in Response to Life Stressors  Outcome: Progressing  Goal: Develops/Participates in Therapeutic [place] to Support Successful Transition  Outcome: Progressing  Goal: Rounds/Family Conference  Outcome: Progressing     Problem: Anxiety Signs/Symptoms  Goal: Optimized Energy Level (Anxiety Signs/Symptoms)  Outcome: Progressing  Goal: Improved Somatic Symptoms (Anxiety Signs/Symptoms)  Outcome: Progressing     Problem: Depressive Signs/Symptoms  Goal: Increased Participation and Engagement (Depressive Signs/Symptoms)  Outcome: Progressing  Goal: Improved Mood Symptoms (Depressive Signs/Symptoms)  Outcome: Progressing     Problem: Psychotic Signs/Symptoms  Goal: Optimal Cognitive Function (Psychotic Signs/Symptoms)  Outcome: Progressing  Goal: Increased Participation and Engagement (Psychotic Signs/Symptoms)  Outcome: Progressing     Problem: Fall Injury Risk  Goal: Absence of Fall and Fall-Related Injury  Outcome: Progressing     Problem: Excessive Substance Use  Goal: Optimized Energy Level (Excessive Substance Use)  Outcome: Progressing  Goal: Improved Behavioral Control (Excessive Substance Use)  Outcome: Progressing
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
## 2025-02-04 ENCOUNTER — APPOINTMENT (OUTPATIENT)
Dept: RADIOLOGY | Facility: HOSPITAL | Age: 65
End: 2025-02-04
Attending: PSYCHIATRY & NEUROLOGY
Payer: MEDICARE

## 2025-02-04 PROCEDURE — 25000003 PHARM REV CODE 250: Performed by: PSYCHIATRY & NEUROLOGY

## 2025-02-04 PROCEDURE — 63600175 PHARM REV CODE 636 W HCPCS: Mod: JZ | Performed by: NURSE PRACTITIONER

## 2025-02-04 PROCEDURE — 70450 CT HEAD/BRAIN W/O DYE: CPT | Mod: TC

## 2025-02-04 PROCEDURE — 25000003 PHARM REV CODE 250: Performed by: NURSE PRACTITIONER

## 2025-02-04 PROCEDURE — 11400000 HC PSYCH PRIVATE ROOM

## 2025-02-04 RX ORDER — ESCITALOPRAM OXALATE 10 MG/1
10 TABLET ORAL DAILY
Status: DISCONTINUED | OUTPATIENT
Start: 2025-02-05 | End: 2025-02-11 | Stop reason: HOSPADM

## 2025-02-04 RX ORDER — KETOROLAC TROMETHAMINE 30 MG/ML
30 INJECTION, SOLUTION INTRAMUSCULAR; INTRAVENOUS ONCE
Status: COMPLETED | OUTPATIENT
Start: 2025-02-04 | End: 2025-02-04

## 2025-02-04 RX ORDER — METOPROLOL TARTRATE 25 MG/1
25 TABLET, FILM COATED ORAL 2 TIMES DAILY
Status: DISCONTINUED | OUTPATIENT
Start: 2025-02-04 | End: 2025-02-11 | Stop reason: HOSPADM

## 2025-02-04 RX ORDER — DICYCLOMINE HYDROCHLORIDE 10 MG/1
10 CAPSULE ORAL 3 TIMES DAILY
Status: DISCONTINUED | OUTPATIENT
Start: 2025-02-04 | End: 2025-02-11 | Stop reason: HOSPADM

## 2025-02-04 RX ORDER — ROPINIROLE 0.25 MG/1
0.5 TABLET, FILM COATED ORAL NIGHTLY
Status: DISCONTINUED | OUTPATIENT
Start: 2025-02-04 | End: 2025-02-11 | Stop reason: HOSPADM

## 2025-02-04 RX ORDER — QUETIAPINE FUMARATE 50 MG/1
50 TABLET, FILM COATED ORAL 2 TIMES DAILY
Status: DISCONTINUED | OUTPATIENT
Start: 2025-02-04 | End: 2025-02-06

## 2025-02-04 RX ADMIN — HYDROXYZINE PAMOATE 50 MG: 50 CAPSULE ORAL at 08:02

## 2025-02-04 RX ADMIN — ROPINIROLE HYDROCHLORIDE 0.5 MG: 0.25 TABLET, FILM COATED ORAL at 08:02

## 2025-02-04 RX ADMIN — QUETIAPINE FUMARATE 50 MG: 50 TABLET ORAL at 08:02

## 2025-02-04 RX ADMIN — ACETAMINOPHEN 650 MG: 325 TABLET ORAL at 05:02

## 2025-02-04 RX ADMIN — METOPROLOL TARTRATE 25 MG: 25 TABLET, FILM COATED ORAL at 08:02

## 2025-02-04 RX ADMIN — KETOROLAC TROMETHAMINE 30 MG: 30 INJECTION, SOLUTION INTRAMUSCULAR; INTRAVENOUS at 03:02

## 2025-02-04 RX ADMIN — QUETIAPINE FUMARATE 25 MG: 25 TABLET ORAL at 08:02

## 2025-02-04 RX ADMIN — ACETAMINOPHEN 650 MG: 325 TABLET ORAL at 12:02

## 2025-02-04 RX ADMIN — ESCITALOPRAM OXALATE 5 MG: 5 TABLET, FILM COATED ORAL at 08:02

## 2025-02-04 RX ADMIN — DICYCLOMINE HYDROCHLORIDE 10 MG: 10 CAPSULE ORAL at 08:02

## 2025-02-04 NOTE — PLAN OF CARE
02/04/25 1606   Step 1: Warning Signs   Warning Sign 1 my sickness,  Crohn's   Warning Sign 2 my body not absorbing proteins and nutrients like it needs, having a chronic episode   Warning Sign 3 n/a   Step 2: Internal coping strategies - Things I can do to take my mind off my problems without contacting another person:   Coping Strategy 1 watching tv   Coping Strategy 2 cooking   Coping Strategy 3 n/a   Step 3: People and social settings that provide distraction:   1. Name Jolly Traylor (sister in law)   2. Name Caitlyn Merchant (friend)   3. Place caitlyn's home   4. Place go out to lunch (restaurant)    Step 4: People whom I can ask for help:   1. Person Rashida Iniguez (mother)   2. Person n/a   3. Person n/a   Step 5: Professionals or agencies I can contact during a crisis:   1. Clinician Name [facility] [address] [place] [address]       Phone [phone]   3. Suicide Prevention Lifeline: 988 suicide prevention lifeline 988   4. [facility] Emergency Service       911       Emergency Services Phone warm line [phone]   Step 6: Making the environment safer (plan for lethal means safety)   Safe Environment Plan go to the doctor before doing something drastic or ask for help   Safe Environment Optional: What is most important to me and worth living for? my mother, my family   Safety Plan Tasks   Provided a Hard Copy to the Patient Y   Explained How to Follow the Steps Y   Discussed Facilitators and Barriers Y

## 2025-02-04 NOTE — NURSING
Pt c/o pain, new orders noted from Dr Stauffer.  Pt given injection of Toradol.  Pt tolerated well.  Pt  currently in with  for psycho social.  Pt appears calm and cooperative.

## 2025-02-04 NOTE — PROGRESS NOTES
"PSYCHIATRY DAILY INPATIENT PROGRESS NOTE  SUBSEQUENT HOSPITAL VISIT    ENCOUNTER DATE: 2/4/2025  SITE: [facility]    DATE OF ADMISSION: 2/2/2025 11:46 PM  LENGTH OF STAY: 2 days      CHIEF COMPLAINT   Charline Greenfield is a 64 y.o. female, seen during daily fisher rounds on the inpatient unit.  Charline Greenfield presented with the chief complaint of mood disturbance/psychosis, "I've been depressed."       The patient was seen and examined. The chart was reviewed.     Reviewed notes from Rns, RD, NP, and LPN and labs from the last 24 hours.    The patient's case was discussed with the treatment team/care providers today including Rns, CTRS, NP, and SW    Staff reports no behavioral or management issues.     The patient has been compliant with treatment.      Subjective 02/04/2025       Today the patient reports that she is tired. She remains depressed and apathetic.  Symptoms of depression persist and remain impairing.  She notes less AVH today- CT of the head scheduled for today  She remains very anxious.  She discussed her "issues that snowballed me into this state.. I have something in my colon that needs to come out. The upcoming surgery is really stressing me out." She is very worried about post operation pain/pain during the recovery.    She remains overwhelmed by life stressors. She was very tearful.        The patient denies any side effects to medications.    At this time symptoms remains severe, functionally and behaviorally impairing and pt remains in need of continued acute inpatient hospitalization for both safety and stabilization.           Psychiatric ROS (observed, reported, or endorsed/denied):  Depressed mood - Continuing  Interest/pleasure/anhedonia: Continuing  Guilt/hopelessness/worthlessness - Continuing  Changes in Sleep - Continuing  Changes in Appetite - Continuing  Changes in Concentration - Continuing  Changes in Energy - Continuing  PMA/R- denies  Suicidal- active/passive ideations - " Continuing  Homicidal ideations: active/passive ideations - No    Hallucinations - decreasing steadily  Delusions - denies  Disorganized behavior - denies  Disorganized speech - denies  Negative symptoms - denies    Elevated mood - denies  Decreased need for sleep - denies  Grandiosity - denies  Racing thoughts - denies  Impulsivity - denies  Irritability- denies  Increased energy - denies  Distractibility - denies  Increase in goal-directed activity or PMA- denies    Symptoms of SAVI - Continuing  Symptoms of Panic Disorder- No  Symptoms of PTSD - variable        Overall progress: Patient is showing no improvement on the Unit to date        Psychotherapy:  Target symptoms: depression, anxiety , psychosis  Why chosen therapy is appropriate versus another modality: relevant to diagnosis, patient responds to this modality, evidence based practice  Outcome monitoring methods: self-report, observation  Therapeutic intervention type: insight oriented psychotherapy, behavior modifying psychotherapy, supportive psychotherapy, interactive psychotherapy  Topics discussed/themes: difficulty managing affect in interpersonal relationships, building skills sets for symptom management  The patient's response to the intervention is accepting. The patient's progress toward treatment goals is limited.   Duration of intervention: 16 minutes.        Medical ROS  General ROS: negative  Ophthalmic ROS: negative  ENT ROS: negative  Allergy and Immunology ROS: negative  Hematological and Lymphatic ROS: negative  Endocrine ROS: negative  Respiratory ROS: no cough, shortness of breath, or wheezing  Cardiovascular ROS: no chest pain or dyspnea on exertion  Gastrointestinal ROS: +abdominal pain, +change in bowel habits, no black or bloody stools (GI Issues are chronic)  Genito-Urinary ROS: no dysuria, trouble voiding, or hematuria  Musculoskeletal ROS: negative  Neurological ROS: no TIA or stroke symptoms  Dermatological ROS: negative    "      PAST MEDICAL HISTORY   Past Medical History:   Diagnosis Date    Anxiety disorder, unspecified     Bipolar disorder, unspecified     Crohn disease     Hypertension            PSYCHOTROPIC MEDICATIONS   Scheduled Meds:   EScitalopram oxalate  5 mg Oral Daily    QUEtiapine  25 mg Oral BID     Continuous Infusions:  PRN Meds:.  Current Facility-Administered Medications:     acetaminophen, 650 mg, Oral, Q6H PRN    aluminum-magnesium hydroxide-simethicone, 30 mL, Oral, Q6H PRN    benzonatate, 100 mg, Oral, TID PRN    benztropine mesylate, 2 mg, Intramuscular, Q8H PRN    hydrOXYzine pamoate, 50 mg, Oral, Q6H PRN    loperamide, 2 mg, Oral, PRN    nicotine, 1 patch, Transdermal, Daily PRN    OLANZapine, 10 mg, Oral, Q8H PRN **AND** OLANZapine, 10 mg, Intramuscular, Q8H PRN    ondansetron, 4 mg, Oral, Q8H PRN    promethazine, 25 mg, Oral, Q6H PRN        EXAMINATION    VITALS   Vitals:    02/03/25 0739 02/03/25 1221 02/03/25 1916 02/04/25 0743   BP: 126/66  137/71 137/80   BP Location: Right arm  Left arm Left arm   Patient Position: Sitting  Sitting Sitting   Pulse: (!) 112  109 102   Resp: 20  20 20   Temp: 98.1 °F (36.7 °C)  99.6 °F (37.6 °C) 97.4 °F (36.3 °C)   TempSrc: Oral  Oral Oral   SpO2: 98%  96% 97%   Weight:  73.8 kg (162 lb 11.2 oz)     Height:  5' 4" (1.626 m)         Body mass index is 27.93 kg/m².      CONSTITUTIONAL  General Appearance: unremarkable, age appropriate, obese     MUSCULOSKELETAL  Muscle Strength and Tone:no dyskinesia, no dystonia, no tremor, no tic  Abnormal Involuntary Movements: No  Gait and Station: non-ataxic     PSYCHIATRIC   Level of Consciousness: awake and alert   Orientation: person, place, time, and situation  Grooming: Disheveled and Hospital garb  Psychomotor Behavior: normal, cooperative, friendly and cooperative, eye contact normal, no PMA/R  Speech: normal tone, normal rate, normal pitch, normal volume, spontaneous; stutters at times  Language: grossly intact, able to name, " able to repeat  Mood: anxious and dysphoric  Affect: Anxious, Consistent with mood, Congruent with thought, and Constricted  Thought Process: linear, logical  Associations: intact   Thought Content: +SI, denies HI, and no delusions  Perceptions: less AH and less VH  Memory: Able to recall past events, Remote intact, and Recent intact  Attention:Normal and Attends to interview without distraction  Fund of Knowledge: Aware of current events and Vocabulary appropriate   Estimate if Intelligence:  Average based on work/education history, vocabulary and mental status exam  Insight: intact, has awareness of illness- fair  Judgment: behavior is adequate to circumstances, age appropriate- fair       DIAGNOSTIC TESTING   Laboratory Results  No results found for this or any previous visit (from the past 24 hours).          MEDICAL DECISION MAKING      ASSESSMENT:   MDD, recurrent, severe with psychotic features  SAVI  PTSD     Psychosocial stressors     Cannabis Abuse     Chrons disease  HTN           PROBLEM LIST AND MANAGEMENT PLANS     Depression with psychosis: pt counseled  -start trial of Lexapro at 5 mg po q day- increase to 10 mg po q day tomorrow  -start trial of adjunctvie Serqouel 25 mg po BID- increase to 50 mg po BID today     Stop home cymbalta and zyprexa (ineffective)  -check CT head given new onset psychosis     SAVI: pt counseled  -meds as above  -start vistaril prn     PTSD: pt counseled  -meds as above  -consider trial of prazosin     Psychosocial stressors: pt counseled  -SW consulted to assist with resources     Cannabis Abuse: pt counseled     Chrons disease: pt counseled  -med consulted     HTN: pt counseled  -med consulted           Discussed diagnosis, risks and benefits of proposed treatment vs alternative treatments vs no treatment, potential side effects of these treatments and the inherent unpredictability of treatment. The patient expresses understanding of the above and displays the capacity to  agree with this treatment given said understanding. Patient also agrees that, currently, the benefits outweigh the risks and would like to pursue/continue treatment at this time.    Any medications being used off-label were discussed with the patient inclusive of the evidence base for the use of the medications and consent was obtained for the off-label use of the medication.       DISCHARGE PLANNING  Expected Disposition Plan: Home or Self Care      NEED FOR CONTINUED HOSPITALIZATION  Psychiatric illness continues to pose a potential threat to life or bodily function, of self or others, thereby requiring the need for continued inpatient psychiatric hospitalization: Yes, due to: significant psychotic thought disorder, aggressive neuroleptic titration, hallucinations, danger to self, gravely disabled, and suicidal ideation, as evidenced by:  Ongoing concerns with SI., Ongoing concerns with grave disability with patient unable to perform basic feeding, hygiene and dressing activities without significant constant support., and Ongoing concerns with perceptual aberrancy and paranoid persecutory delusions leading to potential harm of self or others.    Protective inpatient pyschiatric hospitalization required while a safe disposition plan is enacted: Yes    Patient stabilized and ready for discharge from inpatient psychiatric unit: No        STAFF:   Petey Ayoub MD  Psychiatry

## 2025-02-04 NOTE — NURSING
Patient c/o generalized pain, received prn medication as ordered, see emar. No adverse reaction noted. Patient currently sitting in dining room eating lunch. No distress noted.
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
## 2025-02-05 PROCEDURE — 11400000 HC PSYCH PRIVATE ROOM

## 2025-02-05 PROCEDURE — 25000003 PHARM REV CODE 250: Performed by: PSYCHIATRY & NEUROLOGY

## 2025-02-05 PROCEDURE — 25000003 PHARM REV CODE 250: Performed by: NURSE PRACTITIONER

## 2025-02-05 RX ORDER — KETOROLAC TROMETHAMINE 10 MG/1
10 TABLET, FILM COATED ORAL EVERY 6 HOURS PRN
Status: DISPENSED | OUTPATIENT
Start: 2025-02-05 | End: 2025-02-10

## 2025-02-05 RX ADMIN — HYDROXYZINE PAMOATE 50 MG: 50 CAPSULE ORAL at 08:02

## 2025-02-05 RX ADMIN — KETOROLAC TROMETHAMINE 10 MG: 10 TABLET, FILM COATED ORAL at 01:02

## 2025-02-05 RX ADMIN — ACETAMINOPHEN 650 MG: 325 TABLET ORAL at 12:02

## 2025-02-05 RX ADMIN — DICYCLOMINE HYDROCHLORIDE 10 MG: 10 CAPSULE ORAL at 02:02

## 2025-02-05 RX ADMIN — QUETIAPINE FUMARATE 50 MG: 50 TABLET ORAL at 08:02

## 2025-02-05 RX ADMIN — DICYCLOMINE HYDROCHLORIDE 10 MG: 10 CAPSULE ORAL at 08:02

## 2025-02-05 RX ADMIN — METOPROLOL TARTRATE 25 MG: 25 TABLET, FILM COATED ORAL at 08:02

## 2025-02-05 RX ADMIN — ROPINIROLE HYDROCHLORIDE 0.5 MG: 0.25 TABLET, FILM COATED ORAL at 08:02

## 2025-02-05 RX ADMIN — ESCITALOPRAM OXALATE 10 MG: 10 TABLET ORAL at 08:02

## 2025-02-05 RX ADMIN — ACETAMINOPHEN 650 MG: 325 TABLET ORAL at 08:02

## 2025-02-05 NOTE — PROGRESS NOTES
"PSYCHIATRY DAILY INPATIENT PROGRESS NOTE  SUBSEQUENT HOSPITAL VISIT    ENCOUNTER DATE: 2/5/2025  SITE: [facility]    DATE OF ADMISSION: 2/2/2025 11:46 PM  LENGTH OF STAY: 3 days      CHIEF COMPLAINT   Charline Greenfield is a 64 y.o. female, seen during daily fisher rounds on the inpatient unit.  Charline Greenfield presented with the chief complaint of mood disturbance/psychosis, "I've been depressed."       The patient was seen and examined. The chart was reviewed.     Reviewed notes from Rns, RD, NP, and LPN and labs from the last 24 hours.    The patient's case was discussed with the treatment team/care providers today including Rns, CTRS, NP, and SW    Staff reports no behavioral or management issues.     The patient has been compliant with treatment.      Subjective 02/05/2025       Patient observed sitting in bed, awake, alert, oriented to person place, time, situation. Reports feeling "a little anxious" at this time due to being unable to find a list of phone numbers that she wrote down but otherwise denies subjective complaints. Rate of speech somewhat rapid but seems to be improving. Patient endorses hx of bipolar disorder and states that last hospitalization was approximately 8 years ago 2/2 manic episode. Reports she has recently been stable on olanzapine and cymbalta. She states reason for this hospitalization was due to having a "nervous breakdown" which she later referred to as a "manic episode."    Pt demonstrating mild improvement compared to previous notes. Reports sleeping well last night.         The patient denies any side effects to medications.    At this time symptoms remains severe, functionally and behaviorally impairing and pt remains in need of continued acute inpatient hospitalization for both safety and stabilization.           Psychiatric ROS (observed, reported, or endorsed/denied):  Depressed mood - less  Interest/pleasure/anhedonia: less  Guilt/hopelessness/worthlessness - " Continuing  Changes in Sleep - less  Changes in Appetite - Continuing  Changes in Concentration - Continuing  Changes in Energy - Continuing  PMA/R- denies  Suicidal- active/passive ideations - Continuing  Homicidal ideations: active/passive ideations - No    Hallucinations - decreasing steadily  Delusions - denies  Disorganized behavior - denies  Disorganized speech - denies  Negative symptoms - denies    Elevated mood - denies  Decreased need for sleep - denies  Grandiosity - denies  Racing thoughts - denies  Impulsivity - denies  Irritability- denies  Increased energy - denies  Distractibility - denies  Increase in goal-directed activity or PMA- denies    Symptoms of SAVI - Continuing  Symptoms of Panic Disorder- No  Symptoms of PTSD - variable        Overall progress: Patient is showing mild improvement         Psychotherapy:  Target symptoms: depression, anxiety , psychosis  Why chosen therapy is appropriate versus another modality: relevant to diagnosis, patient responds to this modality, evidence based practice  Outcome monitoring methods: self-report, observation  Therapeutic intervention type: insight oriented psychotherapy, behavior modifying psychotherapy, supportive psychotherapy, interactive psychotherapy  Topics discussed/themes: difficulty managing affect in interpersonal relationships, building skills sets for symptom management  The patient's response to the intervention is accepting. The patient's progress toward treatment goals is good.   Duration of intervention: 16 minutes.        Medical ROS  General ROS: negative  Ophthalmic ROS: negative  ENT ROS: negative  Allergy and Immunology ROS: negative  Hematological and Lymphatic ROS: negative  Endocrine ROS: negative  Respiratory ROS: no cough, shortness of breath, or wheezing  Cardiovascular ROS: no chest pain or dyspnea on exertion  Gastrointestinal ROS: +abdominal pain, +change in bowel habits, no black or bloody stools (GI Issues are  chronic)  Genito-Urinary ROS: no dysuria, trouble voiding, or hematuria  Musculoskeletal ROS: negative  Neurological ROS: no TIA or stroke symptoms  Dermatological ROS: negative         PAST MEDICAL HISTORY   Past Medical History:   Diagnosis Date    Anxiety disorder, unspecified     Bipolar disorder, unspecified     Crohn disease     Hypertension            PSYCHOTROPIC MEDICATIONS   Scheduled Meds:   dicyclomine  10 mg Oral TID    EScitalopram oxalate  10 mg Oral Daily    metoprolol tartrate  25 mg Oral BID    QUEtiapine  50 mg Oral BID    rOPINIRole  0.5 mg Oral QHS     Continuous Infusions:  PRN Meds:.  Current Facility-Administered Medications:     acetaminophen, 650 mg, Oral, Q6H PRN    aluminum-magnesium hydroxide-simethicone, 30 mL, Oral, Q6H PRN    benzonatate, 100 mg, Oral, TID PRN    benztropine mesylate, 2 mg, Intramuscular, Q8H PRN    hydrOXYzine pamoate, 50 mg, Oral, Q6H PRN    loperamide, 2 mg, Oral, PRN    nicotine, 1 patch, Transdermal, Daily PRN    OLANZapine, 10 mg, Oral, Q8H PRN **AND** OLANZapine, 10 mg, Intramuscular, Q8H PRN    ondansetron, 4 mg, Oral, Q8H PRN    promethazine, 25 mg, Oral, Q6H PRN        EXAMINATION    VITALS   Vitals:    02/03/25 1916 02/04/25 0743 02/04/25 1913 02/05/25 0727   BP: 137/71 137/80 133/63 126/64   BP Location: Left arm Left arm Left arm Left arm   Patient Position: Sitting Sitting Sitting Sitting   Pulse: 109 102 104 102   Resp: 20 20 20 20   Temp: 99.6 °F (37.6 °C) 97.4 °F (36.3 °C) 99 °F (37.2 °C) 98.6 °F (37 °C)   TempSrc: Oral Oral Oral Oral   SpO2: 96% 97% 97% (!) 94%   Weight:       Height:           Body mass index is 27.93 kg/m².      CONSTITUTIONAL  General Appearance: unremarkable, age appropriate, obese     MUSCULOSKELETAL  Muscle Strength and Tone:no dyskinesia, no dystonia, no tremor, no tic  Abnormal Involuntary Movements: No  Gait and Station: non-ataxic     PSYCHIATRIC   Level of Consciousness: awake and alert   Orientation: person, place, time,  "and situation  Grooming: Disheveled and Hospital garb  Psychomotor Behavior: normal, cooperative, friendly and cooperative, eye contact normal, no PMA/R  Speech: normal tone, normal rate, normal pitch, normal volume, spontaneous; stutters at times  Language: grossly intact, able to name, able to repeat  Mood: "a little anxious"  Affect: anxious but improving, grossly appropriateCongruent with thought, and Constricted  Thought Process: linear, logical  Associations: intact   Thought Content: denies SI, denies HI, and no delusions  Perceptions: less AH and less VH  Memory: Able to recall past events, Remote intact, and Recent intact  Attention:Normal and Attends to interview without distraction  Fund of Knowledge: Aware of current events and Vocabulary appropriate   Estimate if Intelligence:  Average based on work/education history, vocabulary and mental status exam  Insight: intact, has awareness of illness- fair  Judgment: behavior is adequate to circumstances, age appropriate- fair       DIAGNOSTIC TESTING   Laboratory Results  No results found for this or any previous visit (from the past 24 hours).          MEDICAL DECISION MAKING      ASSESSMENT:   MDD, recurrent, severe with psychotic features  SAVI  PTSD     Psychosocial stressors     Cannabis Abuse     Chrons disease  HTN           PROBLEM LIST AND MANAGEMENT PLANS     Depression with psychosis: pt counseled  -start trial of Lexapro at 5 mg po q day- increase to 10 mg po q day today  -start trial of adjunctvie Serqouel 25 mg po BID- increase to 50 mg po BID today- Continue current dose, consider initiating aristada for bipolar disorder     Stop home cymbalta and zyprexa (ineffective)  -check CT head given new onset psychosis     SAVI: pt counseled  -meds as above  -start vistaril prn     PTSD: pt counseled  -meds as above  -consider trial of prazosin     Psychosocial stressors: pt counseled  -SW consulted to assist with resources     Cannabis Abuse: pt " counseled     Chrons disease: pt counseled  -med consulted     HTN: pt counseled  -med consulted           Discussed diagnosis, risks and benefits of proposed treatment vs alternative treatments vs no treatment, potential side effects of these treatments and the inherent unpredictability of treatment. The patient expresses understanding of the above and displays the capacity to agree with this treatment given said understanding. Patient also agrees that, currently, the benefits outweigh the risks and would like to pursue/continue treatment at this time.    Any medications being used off-label were discussed with the patient inclusive of the evidence base for the use of the medications and consent was obtained for the off-label use of the medication.       DISCHARGE PLANNING  Expected Disposition Plan: Home or Self Care      NEED FOR CONTINUED HOSPITALIZATION  Psychiatric illness continues to pose a potential threat to life or bodily function, of self or others, thereby requiring the need for continued inpatient psychiatric hospitalization: Yes, due to: significant psychotic thought disorder, aggressive neuroleptic titration, hallucinations, danger to self, gravely disabled, and suicidal ideation, as evidenced by:  Ongoing concerns with SI., Ongoing concerns with grave disability with patient unable to perform basic feeding, hygiene and dressing activities without significant constant support., and Ongoing concerns with perceptual aberrancy and paranoid persecutory delusions leading to potential harm of self or others.    Protective inpatient pyschiatric hospitalization required while a safe disposition plan is enacted: Yes    Patient stabilized and ready for discharge from inpatient psychiatric unit: No        STAFF:   Sarthak Fabian NP  Psychiatry

## 2025-02-05 NOTE — PLAN OF CARE
CESARIO called Pt collateral, Rashida Iniguez [phone], mother and was unable to reach her. CESARIO will try again at a later date.

## 2025-02-05 NOTE — PLAN OF CARE
POC reviewed this shift and is on going. Patient is withdrawn, depressed, calm, cooperative, quiet, anxious, and sleeping. Denies Suicidal Ideation, Homicidal Ideation, Auditory Hallucinations, Visual Hallucinations, Tactile Hallucinations, Gustatory Hallucinations, and Delusions. Patient seems a little better today. Patient has been isolating in her room a lot during the day. Patient's appetite is improving, she gets up for her meals and snacks. Patient has no complaints at present. Patient did participate in the group session that was conducted today. Pt continues to be medication compliant and staff will continue to monitor pt closely while on the unit.

## 2025-02-06 PROCEDURE — 11400000 HC PSYCH PRIVATE ROOM

## 2025-02-06 PROCEDURE — 25000003 PHARM REV CODE 250: Performed by: PSYCHIATRY & NEUROLOGY

## 2025-02-06 PROCEDURE — 25000003 PHARM REV CODE 250: Performed by: NURSE PRACTITIONER

## 2025-02-06 RX ORDER — QUETIAPINE FUMARATE 100 MG/1
100 TABLET, FILM COATED ORAL 2 TIMES DAILY
Status: DISCONTINUED | OUTPATIENT
Start: 2025-02-06 | End: 2025-02-10

## 2025-02-06 RX ORDER — GABAPENTIN 100 MG/1
100 CAPSULE ORAL 3 TIMES DAILY
Status: DISCONTINUED | OUTPATIENT
Start: 2025-02-06 | End: 2025-02-11 | Stop reason: HOSPADM

## 2025-02-06 RX ADMIN — QUETIAPINE FUMARATE 50 MG: 50 TABLET ORAL at 08:02

## 2025-02-06 RX ADMIN — DICYCLOMINE HYDROCHLORIDE 10 MG: 10 CAPSULE ORAL at 08:02

## 2025-02-06 RX ADMIN — DICYCLOMINE HYDROCHLORIDE 10 MG: 10 CAPSULE ORAL at 02:02

## 2025-02-06 RX ADMIN — GABAPENTIN 100 MG: 100 CAPSULE ORAL at 09:02

## 2025-02-06 RX ADMIN — GABAPENTIN 100 MG: 100 CAPSULE ORAL at 02:02

## 2025-02-06 RX ADMIN — GABAPENTIN 100 MG: 100 CAPSULE ORAL at 08:02

## 2025-02-06 RX ADMIN — METOPROLOL TARTRATE 25 MG: 25 TABLET, FILM COATED ORAL at 08:02

## 2025-02-06 RX ADMIN — QUETIAPINE FUMARATE 100 MG: 100 TABLET ORAL at 08:02

## 2025-02-06 RX ADMIN — ACETAMINOPHEN 650 MG: 325 TABLET ORAL at 03:02

## 2025-02-06 RX ADMIN — ESCITALOPRAM OXALATE 10 MG: 10 TABLET ORAL at 08:02

## 2025-02-06 RX ADMIN — ACETAMINOPHEN 650 MG: 325 TABLET ORAL at 08:02

## 2025-02-06 RX ADMIN — ROPINIROLE HYDROCHLORIDE 0.5 MG: 0.25 TABLET, FILM COATED ORAL at 08:02

## 2025-02-06 NOTE — PROGRESS NOTES
"PSYCHIATRY DAILY INPATIENT PROGRESS NOTE  SUBSEQUENT HOSPITAL VISIT    ENCOUNTER DATE: 2/6/2025  SITE: [facility]    DATE OF ADMISSION: 2/2/2025 11:46 PM  LENGTH OF STAY: 4 days      CHIEF COMPLAINT   Charline Greenfield is a 64 y.o. female, seen during daily fisher rounds on the inpatient unit.  Charline Greenfield presented with the chief complaint of mood disturbance/psychosis, "I've been depressed."       The patient was seen and examined. The chart was reviewed.     Reviewed notes from Rns and NP and labs from the last 24 hours.    The patient's case was discussed with the treatment team/care providers today including Rns, CTRS, NP, and SW    Staff reports no behavioral or management issues.     The patient has been compliant with treatment.      Subjective 02/06/2025       Per yesterday's notes: Patient observed sitting in bed, awake, alert, oriented to person place, time, situation. Reports feeling "a little anxious" at this time due to being unable to find a list of phone numbers that she wrote down but otherwise denies subjective complaints. Rate of speech somewhat rapid but seems to be improving. Patient endorses hx of bipolar disorder and states that last hospitalization was approximately 8 years ago 2/2 manic episode. Reports she has recently been stable on olanzapine and cymbalta. She states reason for this hospitalization was due to having a "nervous breakdown" which she later referred to as a "manic episode."  Pt demonstrating mild improvement compared to previous notes. Reports sleeping well last night.     Today, she was very irritable and inappropriately focused on discharge. SHe has been very antagonistic to treatment.   -she discussed how all of her life stressors (especially health issues and chronic pain) led her to have SI. These issue persist and she remains high risks for SI/SA.  -she denied AVH today  -Depression and anxiety persist and remain elevated.  -I/J are currently poor.    The " patient denies any side effects to medications.    At this time symptoms remains severe, functionally and behaviorally impairing and pt remains in need of continued acute inpatient hospitalization for both safety and stabilization.           Psychiatric ROS (observed, reported, or endorsed/denied):  Depressed mood - decreasing slowly  Interest/pleasure/anhedonia: decreasing slowly  Guilt/hopelessness/worthlessness - decreasing slowly  Changes in Sleep - decreasing slowly  Changes in Appetite - decreasing slowly  Changes in Concentration - decreasing slowly  Changes in Energy - decreasing slowly  PMA/R- denies  Suicidal- active/passive ideations - variable  Homicidal ideations: active/passive ideations - No    Hallucinations - denies  Delusions - denies  Disorganized behavior - denies  Disorganized speech - denies  Negative symptoms - denies    Elevated mood - denies  Decreased need for sleep - denies  Grandiosity - denies  Racing thoughts - denies  Impulsivity - denies  Irritability- denies  Increased energy - denies  Distractibility - denies  Increase in goal-directed activity or PMA- denies    Symptoms of SAVI - decreasing slowly  Symptoms of Panic Disorder- No  Symptoms of PTSD - decreasing slowly        Overall progress: Patient is showing mild improvement         Psychotherapy:  Target symptoms: depression, anxiety , psychosis  Why chosen therapy is appropriate versus another modality: relevant to diagnosis, patient responds to this modality, evidence based practice  Outcome monitoring methods: self-report, observation  Therapeutic intervention type: insight oriented psychotherapy, behavior modifying psychotherapy, supportive psychotherapy, interactive psychotherapy  Topics discussed/themes: difficulty managing affect in interpersonal relationships, building skills sets for symptom management  The patient's response to the intervention is hostile, reluctant. The patient's progress toward treatment goals is poor.    Duration of intervention: 16 minutes.        Medical ROS  General ROS: negative  Ophthalmic ROS: negative  ENT ROS: negative  Allergy and Immunology ROS: negative  Hematological and Lymphatic ROS: negative  Endocrine ROS: negative  Respiratory ROS: no cough, shortness of breath, or wheezing  Cardiovascular ROS: no chest pain or dyspnea on exertion  Gastrointestinal ROS: +abdominal pain, +change in bowel habits, no black or bloody stools (GI Issues are chronic)  Genito-Urinary ROS: no dysuria, trouble voiding, or hematuria  Musculoskeletal ROS: negative  Neurological ROS: no TIA or stroke symptoms  Dermatological ROS: negative         PAST MEDICAL HISTORY   Past Medical History:   Diagnosis Date    Anxiety disorder, unspecified     Bipolar disorder, unspecified     Crohn disease     Hypertension            PSYCHOTROPIC MEDICATIONS   Scheduled Meds:   dicyclomine  10 mg Oral TID    EScitalopram oxalate  10 mg Oral Daily    metoprolol tartrate  25 mg Oral BID    QUEtiapine  50 mg Oral BID    rOPINIRole  0.5 mg Oral QHS     Continuous Infusions:  PRN Meds:.  Current Facility-Administered Medications:     acetaminophen, 650 mg, Oral, Q6H PRN    aluminum-magnesium hydroxide-simethicone, 30 mL, Oral, Q6H PRN    benzonatate, 100 mg, Oral, TID PRN    benztropine mesylate, 2 mg, Intramuscular, Q8H PRN    hydrOXYzine pamoate, 50 mg, Oral, Q6H PRN    ketorolac, 10 mg, Oral, Q6H PRN    loperamide, 2 mg, Oral, PRN    nicotine, 1 patch, Transdermal, Daily PRN    OLANZapine, 10 mg, Oral, Q8H PRN **AND** OLANZapine, 10 mg, Intramuscular, Q8H PRN    ondansetron, 4 mg, Oral, Q8H PRN    promethazine, 25 mg, Oral, Q6H PRN        EXAMINATION    VITALS   Vitals:    02/04/25 1913 02/05/25 0727 02/05/25 1907 02/06/25 0720   BP: 133/63 126/64 137/80 (!) 143/72   BP Location: Left arm Left arm Left arm Left arm   Patient Position: Sitting Sitting Sitting Sitting   Pulse: 104 102 73 74   Resp: 20 20 20 20   Temp: 99 °F (37.2 °C) 98.6 °F (37  "°C) 99.1 °F (37.3 °C) 98 °F (36.7 °C)   TempSrc: Oral Oral Oral Oral   SpO2: 97% (!) 94% 96% 96%   Weight:       Height:           Body mass index is 27.93 kg/m².      CONSTITUTIONAL  General Appearance: unremarkable, age appropriate, obese     MUSCULOSKELETAL  Muscle Strength and Tone:no dyskinesia, no dystonia, no tremor, no tic  Abnormal Involuntary Movements: No  Gait and Station: non-ataxic     PSYCHIATRIC   Level of Consciousness: awake and alert   Orientation: person, place, time, and situation  Grooming: Disheveled and Hospital garb  Psychomotor Behavior: uncooperative, agitated, eye contact normal, no PMA/R  Speech: normal tone, normal rate, normal pitch, normal volume, spontaneous; stutters at times  Language: grossly intact, able to name, able to repeat  Mood: "ready to go"  Affect: anxious but improving, incongruent with mood/thought, and Constricted, angry  Thought Process: linear, logical  Associations: intact   Thought Content: denies SI, denies HI, and no delusions  Perceptions:no AH and no VH  Memory: Able to recall past events, Remote intact, and Recent intact  Attention:Normal and Attends to interview without distraction  Fund of Knowledge: Aware of current events and Vocabulary appropriate   Estimate if Intelligence:  Average based on work/education history, vocabulary and mental status exam  Insight: intact, has awareness of illness- fair  Judgment: behavior is adequate to circumstances, age appropriate- fair       DIAGNOSTIC TESTING   Laboratory Results  No results found for this or any previous visit (from the past 24 hours).          MEDICAL DECISION MAKING      ASSESSMENT:   MDD, recurrent, severe with psychotic features  SAVI  PTSD     Psychosocial stressors     Cannabis Abuse     Chrons disease  HTN  Chronic pain           PROBLEM LIST AND MANAGEMENT PLANS     Depression with psychosis: pt counseled  -start trial of Lexapro at 5 mg po q day- increase to/continue at 10 mg po q day  -start " trial of adjunctvie Serqouel 25 mg po BID- increase to 50 mg po BID- increase to 100 mg po BID today- Continue current dose,   consider initiating aristada for bipolar disorder (?)     Stop home cymbalta and zyprexa (ineffective)  -check CT head given new onset psychosis- no concerning abnormalities noted      SAVI: pt counseled  -meds as above  -started/continue vistaril prn  -gabapentin as below     PTSD: pt counseled  -meds as above  -consider trial of prazosin     Psychosocial stressors: pt counseled  -SW consulted to assist with resources     Cannabis Abuse: pt counseled     Chrons disease: pt counseled  -med consulted     HTN: pt counseled  -med consulted     Chronic pain: pt counseled  -med consulted  -start trila of gabapentin 100 mg po TID      Discussed diagnosis, risks and benefits of proposed treatment vs alternative treatments vs no treatment, potential side effects of these treatments and the inherent unpredictability of treatment. The patient expresses understanding of the above and displays the capacity to agree with this treatment given said understanding. Patient also agrees that, currently, the benefits outweigh the risks and would like to pursue/continue treatment at this time.    Any medications being used off-label were discussed with the patient inclusive of the evidence base for the use of the medications and consent was obtained for the off-label use of the medication.       DISCHARGE PLANNING  Expected Disposition Plan: Home or Self Care      NEED FOR CONTINUED HOSPITALIZATION  Psychiatric illness continues to pose a potential threat to life or bodily function, of self or others, thereby requiring the need for continued inpatient psychiatric hospitalization: Yes, due to: significant psychotic thought disorder, aggressive neuroleptic titration, hallucinations, danger to self, gravely disabled, and suicidal ideation, as evidenced by:  Ongoing concerns with SI., Ongoing concerns with grave  disability with patient unable to perform basic feeding, hygiene and dressing activities without significant constant support., and Ongoing concerns with perceptual aberrancy and paranoid persecutory delusions leading to potential harm of self or others.    Protective inpatient pyschiatric hospitalization required while a safe disposition plan is enacted: Yes    Patient stabilized and ready for discharge from inpatient psychiatric unit: No        STAFF:   Petey Ayoub MD  Psychiatry

## 2025-02-06 NOTE — PROGRESS NOTES
Spoke to staff at [facility] regarding MRI ordered by patient's outpatient provider Mia Cloud NP. Was informed that obtaining MRI during this hospital encounter not necessary at this time and instructed to ask patient to call GI [facility] upon discharge to reschedule. Nursing staff informed.     [phone]

## 2025-02-06 NOTE — PLAN OF CARE
POC reviewed this shift and is on going. Patient is depressed, cooperative, quiet, anxious, irritable, sleeping, and showing pressured speech. Denies Suicidal Ideation, Homicidal Ideation, Auditory Hallucinations, Visual Hallucinations, Tactile Hallucinations, Gustatory Hallucinations, and Delusions. Patient became irate this morning during treatment team and walked out before Dr Ayoub was finished. Patient then later calmed down. Pt continues to be medication compliant and staff will continue to monitor pt closely while on the unit.

## 2025-02-06 NOTE — PLAN OF CARE
Problem: Adult Behavioral Health Plan of Care  Goal: Plan of Care Review  Outcome: Progressing  Goal: Patient-Specific Goal (Individualization)  Outcome: Progressing  Goal: Adheres to Safety Considerations for Self and Others  Outcome: Progressing  Goal: Absence of New-Onset Illness or Injury  Outcome: Progressing  Goal: Optimized Coping Skills in Response to Life Stressors  Outcome: Progressing     Problem: Anxiety Signs/Symptoms  Goal: Optimized Energy Level (Anxiety Signs/Symptoms)  Outcome: Progressing  Goal: Improved Somatic Symptoms (Anxiety Signs/Symptoms)  Outcome: Progressing     Problem: Depressive Signs/Symptoms  Goal: Increased Participation and Engagement (Depressive Signs/Symptoms)  Outcome: Progressing  Goal: Improved Mood Symptoms (Depressive Signs/Symptoms)  Outcome: Progressing     Problem: Psychotic Signs/Symptoms  Goal: Optimal Cognitive Function (Psychotic Signs/Symptoms)  Outcome: Progressing  Goal: Increased Participation and Engagement (Psychotic Signs/Symptoms)  Outcome: Progressing     Problem: Fall Injury Risk  Goal: Absence of Fall and Fall-Related Injury  Outcome: Progressing     Problem: Excessive Substance Use  Goal: Optimized Energy Level (Excessive Substance Use)  Outcome: Progressing  Goal: Improved Behavioral Control (Excessive Substance Use)  Outcome: Progressing

## 2025-02-06 NOTE — PLAN OF CARE
Problem: Adult Behavioral Health Plan of Care  Goal: Optimized Coping Skills in Response to Life Stressors  Intervention: Promote Effective Coping Strategies  Flowsheets (Taken 2/5/2025 1056)  Supportive Measures:   active listening utilized   verbalization of feelings encouraged   self-reflection promoted   problem-solving facilitated       Behavior:  Engaged and oriented           Intervention: In this CBT-focused group LMSW facilitated discussion on patients learning about boundaries and how to hold healthy boundaries. The goal is that patients will gain greater awareness into their current boundary styles and feel empowered in their ability to set healthy boundaries with others.  Each patient was given handout 9.4 regarding boundaries.                Response: pt present during group but did not participate           Plan:  Staff will continue to encourage pt to participate in process groups to verbalize feelings and develop healthy coping skills.

## 2025-02-06 NOTE — PLAN OF CARE
POC reviewed this shift and is ongoing.  Pt cooperates with staff and interacts with limited peers in dinning room. Pt med compliant with no adverse reaction. Denies SI, and A/V hallucinations.on unit for vs, meds.

## 2025-02-07 PROCEDURE — 25000003 PHARM REV CODE 250: Performed by: NURSE PRACTITIONER

## 2025-02-07 PROCEDURE — 25000003 PHARM REV CODE 250: Performed by: PSYCHIATRY & NEUROLOGY

## 2025-02-07 PROCEDURE — 11400000 HC PSYCH PRIVATE ROOM

## 2025-02-07 RX ORDER — LORAZEPAM 1 MG/1
1 TABLET ORAL ONCE
Status: COMPLETED | OUTPATIENT
Start: 2025-02-07 | End: 2025-02-07

## 2025-02-07 RX ADMIN — ESCITALOPRAM OXALATE 10 MG: 10 TABLET ORAL at 08:02

## 2025-02-07 RX ADMIN — GABAPENTIN 100 MG: 100 CAPSULE ORAL at 03:02

## 2025-02-07 RX ADMIN — METOPROLOL TARTRATE 25 MG: 25 TABLET, FILM COATED ORAL at 08:02

## 2025-02-07 RX ADMIN — DICYCLOMINE HYDROCHLORIDE 10 MG: 10 CAPSULE ORAL at 03:02

## 2025-02-07 RX ADMIN — ROPINIROLE HYDROCHLORIDE 0.5 MG: 0.25 TABLET, FILM COATED ORAL at 08:02

## 2025-02-07 RX ADMIN — ACETAMINOPHEN 650 MG: 325 TABLET ORAL at 08:02

## 2025-02-07 RX ADMIN — DICYCLOMINE HYDROCHLORIDE 10 MG: 10 CAPSULE ORAL at 08:02

## 2025-02-07 RX ADMIN — QUETIAPINE FUMARATE 100 MG: 100 TABLET ORAL at 08:02

## 2025-02-07 RX ADMIN — LORAZEPAM 1 MG: 1 TABLET ORAL at 09:02

## 2025-02-07 RX ADMIN — OLANZAPINE 10 MG: 10 TABLET, FILM COATED ORAL at 08:02

## 2025-02-07 RX ADMIN — GABAPENTIN 100 MG: 100 CAPSULE ORAL at 08:02

## 2025-02-07 NOTE — PLAN OF CARE
Problem: Adult Behavioral Health Plan of Care  Goal: Plan of Care Review  Outcome: Progressing  Goal: Patient-Specific Goal (Individualization)  Outcome: Progressing  Goal: Adheres to Safety Considerations for Self and Others  Outcome: Progressing  Goal: Absence of New-Onset Illness or Injury  Outcome: Progressing  Goal: Optimized Coping Skills in Response to Life Stressors  Outcome: Progressing  Goal: Develops/Participates in Therapeutic [place] to Support Successful Transition  Outcome: Progressing  Goal: Rounds/Family Conference  Outcome: Progressing     Problem: Anxiety Signs/Symptoms  Goal: Optimized Energy Level (Anxiety Signs/Symptoms)  Outcome: Progressing  Goal: Improved Somatic Symptoms (Anxiety Signs/Symptoms)  Outcome: Progressing     Problem: Depressive Signs/Symptoms  Goal: Increased Participation and Engagement (Depressive Signs/Symptoms)  Outcome: Progressing  Goal: Improved Mood Symptoms (Depressive Signs/Symptoms)  Outcome: Progressing     Problem: Psychotic Signs/Symptoms  Goal: Optimal Cognitive Function (Psychotic Signs/Symptoms)  Outcome: Progressing  Goal: Increased Participation and Engagement (Psychotic Signs/Symptoms)  Outcome: Progressing     Problem: Fall Injury Risk  Goal: Absence of Fall and Fall-Related Injury  Outcome: Progressing     Problem: Excessive Substance Use  Goal: Optimized Energy Level (Excessive Substance Use)  Outcome: Progressing  Goal: Improved Behavioral Control (Excessive Substance Use)  Outcome: Progressing

## 2025-02-07 NOTE — PLAN OF CARE
POC reviewed this shift and is ongoing.  Pt cooperative with staff and is out watching tv in dinning room for short period of time. Pt denies SI, a/v hallucinations. No ss of distress. Med compliant with no adverse reaction.

## 2025-02-07 NOTE — PROGRESS NOTES
"PSYCHIATRY DAILY INPATIENT PROGRESS NOTE  SUBSEQUENT HOSPITAL VISIT    ENCOUNTER DATE: 2/7/2025  SITE: [facility]    DATE OF ADMISSION: 2/2/2025 11:46 PM  LENGTH OF STAY: 5 days      CHIEF COMPLAINT   Charline Greenfield is a 64 y.o. female, seen during daily fisher rounds on the inpatient unit.  Charline Greenfield presented with the chief complaint of mood disturbance/psychosis, "I've been depressed."       The patient was seen and examined. The chart was reviewed.     Reviewed notes from Rns and NP and labs from the last 24 hours.    The patient's case was discussed with the treatment team/care providers today including Rns, CTRS, NP, and SW    Staff reports no behavioral or management issues.     The patient has been compliant with treatment.      Subjective 02/07/2025    Patient initially reports her mood is "good," however later in assessment endorses severe anxiety due to "being around all of these people." Patient appears anxious. Discussed patient's conversation with MD yesterday in which she abruptly left assessment area. Pt responded by saying "I don't know, I was just in a bad mood. I have a lot of stress with my GI stuff. I'm feeling better today." Patient endorses some sedation which she feels may be associated with seroquel. Pt informed that due to pharmacy formulary, BID dosing is necessary, but that she may be able to switch to nightly XR dosing at discharge.     Patient became somewhat more anxious and agitated when informed she would not be discharging from the hospital today. SHe is redirectable but requests "something for my nerves"      Psychiatric ROS (observed, reported, or endorsed/denied):  Depressed mood - decreasing slowly  Interest/pleasure/anhedonia: decreasing slowly  Guilt/hopelessness/worthlessness - decreasing slowly  Changes in Sleep - decreasing slowly  Changes in Appetite - decreasing slowly  Changes in Concentration - decreasing slowly  Changes in Energy - decreasing " slowly  PMA/R- denies  Suicidal- active/passive ideations - variable  Homicidal ideations: active/passive ideations - No    Hallucinations - denies  Delusions - denies  Disorganized behavior - denies  Disorganized speech - denies  Negative symptoms - denies    Elevated mood - denies  Decreased need for sleep - denies  Grandiosity - denies  Racing thoughts - denies  Impulsivity - denies  Irritability- denies  Increased energy - denies  Distractibility - denies  Increase in goal-directed activity or PMA- denies    Symptoms of SAVI - decreasing slowly  Symptoms of Panic Disorder- No  Symptoms of PTSD - decreasing slowly        Overall progress: Patient is showing mild improvement         Psychotherapy:  Target symptoms: depression, anxiety , psychosis  Why chosen therapy is appropriate versus another modality: relevant to diagnosis, patient responds to this modality, evidence based practice  Outcome monitoring methods: self-report, observation  Therapeutic intervention type: insight oriented psychotherapy, behavior modifying psychotherapy, supportive psychotherapy, interactive psychotherapy  Topics discussed/themes: difficulty managing affect in interpersonal relationships, building skills sets for symptom management  The patient's response to the intervention is guarded. The patient's progress toward treatment goals is fair, limited, not progressing.   Duration of intervention: 16 minutes.        Medical ROS  General ROS: negative  Ophthalmic ROS: negative  ENT ROS: negative  Allergy and Immunology ROS: negative  Hematological and Lymphatic ROS: negative  Endocrine ROS: negative  Respiratory ROS: no cough, shortness of breath, or wheezing  Cardiovascular ROS: no chest pain or dyspnea on exertion  Gastrointestinal ROS: +abdominal pain, +change in bowel habits, no black or bloody stools (GI Issues are chronic)  Genito-Urinary ROS: no dysuria, trouble voiding, or hematuria  Musculoskeletal ROS: negative  Neurological ROS:  no TIA or stroke symptoms  Dermatological ROS: negative         PAST MEDICAL HISTORY   Past Medical History:   Diagnosis Date    Anxiety disorder, unspecified     Bipolar disorder, unspecified     Crohn disease     Hypertension            PSYCHOTROPIC MEDICATIONS   Scheduled Meds:   dicyclomine  10 mg Oral TID    EScitalopram oxalate  10 mg Oral Daily    gabapentin  100 mg Oral TID    LORazepam  1 mg Oral Once    metoprolol tartrate  25 mg Oral BID    QUEtiapine  100 mg Oral BID    rOPINIRole  0.5 mg Oral QHS     Continuous Infusions:  PRN Meds:.  Current Facility-Administered Medications:     acetaminophen, 650 mg, Oral, Q6H PRN    aluminum-magnesium hydroxide-simethicone, 30 mL, Oral, Q6H PRN    benzonatate, 100 mg, Oral, TID PRN    benztropine mesylate, 2 mg, Intramuscular, Q8H PRN    hydrOXYzine pamoate, 50 mg, Oral, Q6H PRN    ketorolac, 10 mg, Oral, Q6H PRN    loperamide, 2 mg, Oral, PRN    nicotine, 1 patch, Transdermal, Daily PRN    OLANZapine, 10 mg, Oral, Q8H PRN **AND** OLANZapine, 10 mg, Intramuscular, Q8H PRN    ondansetron, 4 mg, Oral, Q8H PRN    promethazine, 25 mg, Oral, Q6H PRN        EXAMINATION    VITALS   Vitals:    02/05/25 1907 02/06/25 0720 02/06/25 1916 02/07/25 0728   BP: 137/80 (!) 143/72 133/67 (!) 147/82   BP Location: Left arm Left arm Left arm Left arm   Patient Position: Sitting Sitting Sitting Sitting   Pulse: 73 74 72 85   Resp: 20 20 20 20   Temp: 99.1 °F (37.3 °C) 98 °F (36.7 °C) 99.2 °F (37.3 °C) 98 °F (36.7 °C)   TempSrc: Oral Oral Oral Oral   SpO2: 96% 96% 95% 97%   Weight:       Height:           Body mass index is 27.93 kg/m².      CONSTITUTIONAL  General Appearance: unremarkable, age appropriate, obese     MUSCULOSKELETAL  Muscle Strength and Tone:no dyskinesia, no dystonia, no tremor, no tic  Abnormal Involuntary Movements: No  Gait and Station: non-ataxic     PSYCHIATRIC   Level of Consciousness: awake and alert   Orientation: person, place, time, and situation  Grooming:  "Disheveled and Hospital garb  Psychomotor Behavior: Cooperative, less agitated, eye contact normal, no PMA/R  Speech: normal tone, normal rate, normal pitch, normal volume, spontaneous; stutters at times  Language: grossly intact, able to name, able to repeat  Mood: "Better"  Affect: anxious but improving, I angry  Thought Process: linear, logical  Associations: intact   Thought Content: denies SI, denies HI, and no delusions  Perceptions:no AH and no VH  Memory: Able to recall past events, Remote intact, and Recent intact  Attention:Normal and Attends to interview without distraction  Fund of Knowledge: Aware of current events and Vocabulary appropriate   Estimate if Intelligence:  Average based on work/education history, vocabulary and mental status exam  Insight: intact, has awareness of illness- fair  Judgment: behavior is adequate to circumstances, age appropriate- fair       DIAGNOSTIC TESTING   Laboratory Results  No results found for this or any previous visit (from the past 24 hours).          MEDICAL DECISION MAKING      ASSESSMENT:   MDD, recurrent, severe with psychotic features  SAVI  PTSD     Psychosocial stressors     Cannabis Abuse     Chrons disease  HTN  Chronic pain           PROBLEM LIST AND MANAGEMENT PLANS     Depression with psychosis: pt counseled  -start trial of Lexapro at 5 mg po q day- increase to/continue at 10 mg po q day  -start trial of adjunctvie Serqouel 25 mg po BID- increase to 50 mg po BID- increase to 100 mg po BID today- Continue current dose,         Stop home cymbalta and zyprexa (ineffective)  -check CT head given new onset psychosis- no concerning abnormalities noted      SAVI: pt counseled  -meds as above  -started/continue vistaril prn  -gabapentin as below     PTSD: pt counseled  -meds as above  -consider trial of prazosin     Psychosocial stressors: pt counseled  -SW consulted to assist with resources     Cannabis Abuse: pt counseled     Chrons disease: pt counseled  -med " consulted     HTN: pt counseled  -med consulted     Chronic pain: pt counseled  -med consulted  -start trila of gabapentin 100 mg po TID      Discussed diagnosis, risks and benefits of proposed treatment vs alternative treatments vs no treatment, potential side effects of these treatments and the inherent unpredictability of treatment. The patient expresses understanding of the above and displays the capacity to agree with this treatment given said understanding. Patient also agrees that, currently, the benefits outweigh the risks and would like to pursue/continue treatment at this time.    Any medications being used off-label were discussed with the patient inclusive of the evidence base for the use of the medications and consent was obtained for the off-label use of the medication.       DISCHARGE PLANNING  Expected Disposition Plan: Home or Self Care      NEED FOR CONTINUED HOSPITALIZATION  Psychiatric illness continues to pose a potential threat to life or bodily function, of self or others, thereby requiring the need for continued inpatient psychiatric hospitalization: Yes, due to: significant psychotic thought disorder, aggressive neuroleptic titration, hallucinations, danger to self, gravely disabled, and suicidal ideation, as evidenced by:  Ongoing concerns with SI., Ongoing concerns with grave disability with patient unable to perform basic feeding, hygiene and dressing activities without significant constant support., and Ongoing concerns with perceptual aberrancy and paranoid persecutory delusions leading to potential harm of self or others.    Protective inpatient pyschiatric hospitalization required while a safe disposition plan is enacted: Yes    Patient stabilized and ready for discharge from inpatient psychiatric unit: No        STAFF:   Sarthak Fabian NP  Psychiatry

## 2025-02-07 NOTE — NURSING
Patient complaining of lower back pain, requested prn medication for pain, received medication. No adverse reaction noted. Patient currently sitting at table in dining room conversing with peers. No distress noted.

## 2025-02-08 PROCEDURE — 25000003 PHARM REV CODE 250: Performed by: PSYCHIATRY & NEUROLOGY

## 2025-02-08 PROCEDURE — 25000003 PHARM REV CODE 250: Performed by: NURSE PRACTITIONER

## 2025-02-08 PROCEDURE — 11400000 HC PSYCH PRIVATE ROOM

## 2025-02-08 RX ADMIN — ACETAMINOPHEN 650 MG: 325 TABLET ORAL at 08:02

## 2025-02-08 RX ADMIN — GABAPENTIN 100 MG: 100 CAPSULE ORAL at 02:02

## 2025-02-08 RX ADMIN — DICYCLOMINE HYDROCHLORIDE 10 MG: 10 CAPSULE ORAL at 08:02

## 2025-02-08 RX ADMIN — KETOROLAC TROMETHAMINE 10 MG: 10 TABLET, FILM COATED ORAL at 09:02

## 2025-02-08 RX ADMIN — QUETIAPINE FUMARATE 100 MG: 100 TABLET ORAL at 08:02

## 2025-02-08 RX ADMIN — ROPINIROLE HYDROCHLORIDE 0.5 MG: 0.25 TABLET, FILM COATED ORAL at 08:02

## 2025-02-08 RX ADMIN — METOPROLOL TARTRATE 25 MG: 25 TABLET, FILM COATED ORAL at 08:02

## 2025-02-08 RX ADMIN — GABAPENTIN 100 MG: 100 CAPSULE ORAL at 08:02

## 2025-02-08 RX ADMIN — DICYCLOMINE HYDROCHLORIDE 10 MG: 10 CAPSULE ORAL at 02:02

## 2025-02-08 RX ADMIN — ESCITALOPRAM OXALATE 10 MG: 10 TABLET ORAL at 08:02

## 2025-02-08 RX ADMIN — ACETAMINOPHEN 650 MG: 325 TABLET ORAL at 01:02

## 2025-02-08 NOTE — PROGRESS NOTES
"PSYCHIATRY DAILY INPATIENT PROGRESS NOTE  SUBSEQUENT HOSPITAL VISIT    ENCOUNTER DATE: 2/8/2025  SITE: [facility]    DATE OF ADMISSION: 2/2/2025 11:46 PM  LENGTH OF STAY: 6 days      CHIEF COMPLAINT   Charline Greenfield is a 64 y.o. female, seen during daily fisher rounds on the inpatient unit.  Charline Greenfield presented with the chief complaint of mood disturbance/psychosis, "I've been depressed."       The patient was seen and examined. The chart was reviewed.     Reviewed notes from Rns and NP and labs from the last 24 hours.    The patient's case was discussed with the treatment team/care providers today including Rns, CTRS, NP, and     Staff reports no behavioral or management issues.     The patient has been compliant with treatment.      Subjective 02/08/2025    Patient initially reports her mood is "good." Pt states that she slept well last night but is anxious this morning. She attributes this to another pt who she reports has been very agitated on the unit. She is very focused on this today.     Tolerating medication adjustment without side effects.     Pt reports improvement in depression. Denies IKE and AVH.           Psychiatric ROS (observed, reported, or endorsed/denied):  Depressed mood - decreasing slowly  Interest/pleasure/anhedonia: decreasing slowly  Guilt/hopelessness/worthlessness - decreasing slowly  Changes in Sleep - decreasing slowly  Changes in Appetite - decreasing slowly  Changes in Concentration - decreasing slowly  Changes in Energy - decreasing slowly  PMA/R- denies  Suicidal- active/passive ideations - variable  Homicidal ideations: active/passive ideations - No    Hallucinations - denies  Delusions - denies  Disorganized behavior - denies  Disorganized speech - denies  Negative symptoms - denies    Elevated mood - denies  Decreased need for sleep - denies  Grandiosity - denies  Racing thoughts - denies  Impulsivity - denies  Irritability- denies  Increased energy - " denies  Distractibility - denies  Increase in goal-directed activity or PMA- denies    Symptoms of SAVI - decreasing slowly  Symptoms of Panic Disorder- No  Symptoms of PTSD - decreasing slowly        Overall progress: Patient is showing mild improvement         Psychotherapy:  Target symptoms: depression, anxiety , psychosis  Why chosen therapy is appropriate versus another modality: relevant to diagnosis, patient responds to this modality, evidence based practice  Outcome monitoring methods: self-report, observation  Therapeutic intervention type: insight oriented psychotherapy, behavior modifying psychotherapy, supportive psychotherapy, interactive psychotherapy  Topics discussed/themes: difficulty managing affect in interpersonal relationships, building skills sets for symptom management  The patient's response to the intervention is guarded. The patient's progress toward treatment goals is fair, limited, not progressing.   Duration of intervention: 16 minutes.        Medical ROS  General ROS: negative  Ophthalmic ROS: negative  ENT ROS: negative  Allergy and Immunology ROS: negative  Hematological and Lymphatic ROS: negative  Endocrine ROS: negative  Respiratory ROS: no cough, shortness of breath, or wheezing  Cardiovascular ROS: no chest pain or dyspnea on exertion  Gastrointestinal ROS: +abdominal pain, +change in bowel habits, no black or bloody stools (GI Issues are chronic)  Genito-Urinary ROS: no dysuria, trouble voiding, or hematuria  Musculoskeletal ROS: negative  Neurological ROS: no TIA or stroke symptoms  Dermatological ROS: negative         PAST MEDICAL HISTORY   Past Medical History:   Diagnosis Date    Anxiety disorder, unspecified     Bipolar disorder, unspecified     Crohn disease     Hypertension            PSYCHOTROPIC MEDICATIONS   Scheduled Meds:   dicyclomine  10 mg Oral TID    EScitalopram oxalate  10 mg Oral Daily    gabapentin  100 mg Oral TID    metoprolol tartrate  25 mg Oral BID     "QUEtiapine  100 mg Oral BID    rOPINIRole  0.5 mg Oral QHS     Continuous Infusions:  PRN Meds:.  Current Facility-Administered Medications:     acetaminophen, 650 mg, Oral, Q6H PRN    aluminum-magnesium hydroxide-simethicone, 30 mL, Oral, Q6H PRN    benzonatate, 100 mg, Oral, TID PRN    benztropine mesylate, 2 mg, Intramuscular, Q8H PRN    hydrOXYzine pamoate, 50 mg, Oral, Q6H PRN    ketorolac, 10 mg, Oral, Q6H PRN    loperamide, 2 mg, Oral, PRN    nicotine, 1 patch, Transdermal, Daily PRN    OLANZapine, 10 mg, Oral, Q8H PRN **AND** OLANZapine, 10 mg, Intramuscular, Q8H PRN    ondansetron, 4 mg, Oral, Q8H PRN    promethazine, 25 mg, Oral, Q6H PRN        EXAMINATION    VITALS   Vitals:    02/06/25 1916 02/07/25 0728 02/07/25 1913 02/08/25 0743   BP: 133/67 (!) 147/82 (!) 150/73 (!) 155/73   BP Location: Left arm Left arm Left arm Left arm   Patient Position: Sitting Sitting Sitting Sitting   Pulse: 72 85 79 76   Resp: 20 20 20 18   Temp: 99.2 °F (37.3 °C) 98 °F (36.7 °C) 99.3 °F (37.4 °C) 97.9 °F (36.6 °C)   TempSrc: Oral Oral Oral Oral   SpO2: 95% 97% 96% 98%   Weight:       Height:           Body mass index is 27.93 kg/m².      CONSTITUTIONAL  General Appearance: unremarkable, age appropriate, obese     MUSCULOSKELETAL  Muscle Strength and Tone:no dyskinesia, no dystonia, no tremor, no tic  Abnormal Involuntary Movements: No  Gait and Station: non-ataxic     PSYCHIATRIC   Level of Consciousness: awake and alert   Orientation: person, place, time, and situation  Grooming: Disheveled and Hospital garb  Psychomotor Behavior: Cooperative, less agitated, eye contact normal, no PMA/R  Speech: normal tone, normal rate, normal pitch, normal volume, spontaneous; stutters at times  Language: grossly intact, able to name, able to repeat  Mood: "Better"  Affect: anxious but improving, I angry  Thought Process: linear, logical  Associations: intact   Thought Content: denies SI, denies HI, and no delusions  Perceptions:no AH " and no VH  Memory: Able to recall past events, Remote intact, and Recent intact  Attention:Normal and Attends to interview without distraction  Fund of Knowledge: Aware of current events and Vocabulary appropriate   Estimate if Intelligence:  Average based on work/education history, vocabulary and mental status exam  Insight: intact, has awareness of illness- fair  Judgment: behavior is adequate to circumstances, age appropriate- fair       DIAGNOSTIC TESTING   Laboratory Results  No results found for this or any previous visit (from the past 24 hours).          MEDICAL DECISION MAKING      ASSESSMENT:   MDD, recurrent, severe with psychotic features  SAVI  PTSD     Psychosocial stressors     Cannabis Abuse     Chrons disease  HTN  Chronic pain           PROBLEM LIST AND MANAGEMENT PLANS     Depression with psychosis: pt counseled  -start trial of Lexapro at 5 mg po q day- increase to/continue at 10 mg po q day  -start trial of adjunctvie Serqouel 25 mg po BID- increase to 50 mg po BID- increase to 100 mg po BID today- Continue current dose,         Stop home cymbalta and zyprexa (ineffective)  -check CT head given new onset psychosis- no concerning abnormalities noted      SAVI: pt counseled  -meds as above  -started/continue vistaril prn  -gabapentin as below     PTSD: pt counseled  -meds as above  -consider trial of prazosin     Psychosocial stressors: pt counseled  -SW consulted to assist with resources     Cannabis Abuse: pt counseled     Chrons disease: pt counseled  -med consulted     HTN: pt counseled  -med consulted     Chronic pain: pt counseled  -med consulted  -start trila of gabapentin 100 mg po TID      Discussed diagnosis, risks and benefits of proposed treatment vs alternative treatments vs no treatment, potential side effects of these treatments and the inherent unpredictability of treatment. The patient expresses understanding of the above and displays the capacity to agree with this treatment given  said understanding. Patient also agrees that, currently, the benefits outweigh the risks and would like to pursue/continue treatment at this time.    Any medications being used off-label were discussed with the patient inclusive of the evidence base for the use of the medications and consent was obtained for the off-label use of the medication.       DISCHARGE PLANNING  Expected Disposition Plan: Home or Self Care      NEED FOR CONTINUED HOSPITALIZATION  Psychiatric illness continues to pose a potential threat to life or bodily function, of self or others, thereby requiring the need for continued inpatient psychiatric hospitalization: Yes, due to: significant psychotic thought disorder, aggressive neuroleptic titration, hallucinations, danger to self, gravely disabled, and suicidal ideation, as evidenced by:  Ongoing concerns with SI., Ongoing concerns with grave disability with patient unable to perform basic feeding, hygiene and dressing activities without significant constant support., and Ongoing concerns with perceptual aberrancy and paranoid persecutory delusions leading to potential harm of self or others.    Protective inpatient pyschiatric hospitalization required while a safe disposition plan is enacted: Yes    Patient stabilized and ready for discharge from inpatient psychiatric unit: No        STAFF:   Pablo Cooper MD  Psychiatry

## 2025-02-08 NOTE — PLAN OF CARE
Patient remains calm and cooperative.  Patient complaint with medication.  Slept through the night

## 2025-02-08 NOTE — NURSING
Patient c/o back pain, requesting pain medication. Patient received medication as ordered, see emar. No adverse reaction. No distress noted.

## 2025-02-08 NOTE — NURSING
Patient continues to c/o back pain, received prn medication, see emar. Patient states ineffectiveness of medication. Patient currently sitting in activity room conversing with peers, no distress noted.

## 2025-02-08 NOTE — PLAN OF CARE
Problem: Adult Behavioral Health Plan of Care  Goal: Plan of Care Review  Outcome: Progressing  Goal: Patient-Specific Goal (Individualization)  Outcome: Progressing  Goal: Adheres to Safety Considerations for Self and Others  Outcome: Progressing  Goal: Absence of New-Onset Illness or Injury  Outcome: Progressing  Goal: Optimized Coping Skills in Response to Life Stressors  Outcome: Progressing  Goal: Develops/Participates in Therapeutic [place] to Support Successful Transition  Outcome: Progressing  Goal: Rounds/Family Conference  Outcome: Progressing     Problem: Anxiety Signs/Symptoms  Goal: Optimized Energy Level (Anxiety Signs/Symptoms)  Outcome: Progressing  Goal: Improved Somatic Symptoms (Anxiety Signs/Symptoms)  Outcome: Progressing     Problem: Depressive Signs/Symptoms  Goal: Increased Participation and Engagement (Depressive Signs/Symptoms)  Outcome: Progressing  Goal: Improved Mood Symptoms (Depressive Signs/Symptoms)  Outcome: Progressing     Problem: Psychotic Signs/Symptoms  Goal: Optimal Cognitive Function (Psychotic Signs/Symptoms)  Outcome: Progressing  Goal: Increased Participation and Engagement (Psychotic Signs/Symptoms)  Outcome: Progressing     Problem: Fall Injury Risk  Goal: Absence of Fall and Fall-Related Injury  Outcome: Progressing     Problem: Excessive Substance Use  Goal: Optimized Energy Level (Excessive Substance Use)  Outcome: Progressing  Goal: Improved Behavioral Control (Excessive Substance Use)  Outcome: Progressing

## 2025-02-09 PROCEDURE — 25000003 PHARM REV CODE 250: Performed by: NURSE PRACTITIONER

## 2025-02-09 PROCEDURE — 11400000 HC PSYCH PRIVATE ROOM

## 2025-02-09 PROCEDURE — 99232 SBSQ HOSP IP/OBS MODERATE 35: CPT | Mod: ,,, | Performed by: PSYCHIATRY & NEUROLOGY

## 2025-02-09 PROCEDURE — 25000003 PHARM REV CODE 250: Performed by: PSYCHIATRY & NEUROLOGY

## 2025-02-09 RX ORDER — ADHESIVE BANDAGE
30 BANDAGE TOPICAL DAILY PRN
Status: DISCONTINUED | OUTPATIENT
Start: 2025-02-09 | End: 2025-02-11 | Stop reason: HOSPADM

## 2025-02-09 RX ADMIN — KETOROLAC TROMETHAMINE 10 MG: 10 TABLET, FILM COATED ORAL at 08:02

## 2025-02-09 RX ADMIN — DICYCLOMINE HYDROCHLORIDE 10 MG: 10 CAPSULE ORAL at 08:02

## 2025-02-09 RX ADMIN — ACETAMINOPHEN 650 MG: 325 TABLET ORAL at 08:02

## 2025-02-09 RX ADMIN — GABAPENTIN 100 MG: 100 CAPSULE ORAL at 08:02

## 2025-02-09 RX ADMIN — KETOROLAC TROMETHAMINE 10 MG: 10 TABLET, FILM COATED ORAL at 05:02

## 2025-02-09 RX ADMIN — ROPINIROLE HYDROCHLORIDE 0.5 MG: 0.25 TABLET, FILM COATED ORAL at 08:02

## 2025-02-09 RX ADMIN — GABAPENTIN 100 MG: 100 CAPSULE ORAL at 03:02

## 2025-02-09 RX ADMIN — METOPROLOL TARTRATE 25 MG: 25 TABLET, FILM COATED ORAL at 08:02

## 2025-02-09 RX ADMIN — QUETIAPINE FUMARATE 100 MG: 100 TABLET ORAL at 08:02

## 2025-02-09 RX ADMIN — DICYCLOMINE HYDROCHLORIDE 10 MG: 10 CAPSULE ORAL at 03:02

## 2025-02-09 RX ADMIN — MAGNESIUM HYDROXIDE 2400 MG: 400 SUSPENSION ORAL at 09:02

## 2025-02-09 RX ADMIN — ESCITALOPRAM OXALATE 10 MG: 10 TABLET ORAL at 08:02

## 2025-02-09 RX ADMIN — HYDROXYZINE PAMOATE 50 MG: 50 CAPSULE ORAL at 05:02

## 2025-02-09 RX ADMIN — HYDROXYZINE PAMOATE 50 MG: 50 CAPSULE ORAL at 09:02

## 2025-02-09 RX ADMIN — ACETAMINOPHEN 650 MG: 325 TABLET ORAL at 12:02

## 2025-02-09 NOTE — NURSING
POC reviewed this shift and is ongoing.  Pt is cooperative with care and complaint with medications.  Pt denies SI/HI/AVH.  Pt reports anxiety and depression, of major concern to pt is her chronic health concerns r/t crones.  Pt reports that she will need to have some of her colon removed.  Pt states she previously had some of her intestines removed and the recovery was difficult.  Pt states she had 'a mental break down' at that time.  Pt is visible in the milieu and interacts well with most peers.

## 2025-02-09 NOTE — PROGRESS NOTES
"PSYCHIATRY DAILY INPATIENT PROGRESS NOTE  SUBSEQUENT HOSPITAL VISIT    ENCOUNTER DATE: 2/9/2025  SITE: [facility]    DATE OF ADMISSION: 2/2/2025 11:46 PM  LENGTH OF STAY: 7 days      CHIEF COMPLAINT   Charline Greenfield is a 64 y.o. female, seen during daily fisher rounds on the inpatient unit.  Charline Greenfield presented with the chief complaint of mood disturbance/psychosis, "I've been depressed."       The patient was seen and examined. The chart was reviewed.     Reviewed notes from Rns and NP and labs from the last 24 hours.    The patient's case was discussed with the treatment team/care providers today including Rns, CTRS, NP, and SW    Staff reports no behavioral or management issues.     The patient has been compliant with treatment.      Subjective 02/09/2025    Patient initially reports her mood is "better." Pt states that she slept well last night. Some improvement in anxiety this morning, though she remains anxious and again attributes this to being in the hospital.     Pt is endorsing bowel pain and states she has not had a BM in 2 days. Reports history of Chrons. She attributes these symptoms to the stress of being in the hospital. States "It's not good."     Pt asking for anxiety medication, counseled on availability of vistaril.     Requesting medication for constipation. Amenable to trial of milk of mag.    Tolerating medication adjustment without side effects.     Pt reports improvement in depression. Denies IKE and AVH.           Psychiatric ROS (observed, reported, or endorsed/denied):  Depressed mood - decreasing slowly  Interest/pleasure/anhedonia: decreasing slowly  Guilt/hopelessness/worthlessness - decreasing slowly  Changes in Sleep - decreasing slowly  Changes in Appetite - decreasing slowly  Changes in Concentration - decreasing slowly  Changes in Energy - decreasing slowly  PMA/R- denies  Suicidal- active/passive ideations - variable  Homicidal ideations: active/passive ideations - " No    Hallucinations - denies  Delusions - denies  Disorganized behavior - denies  Disorganized speech - denies  Negative symptoms - denies    Elevated mood - denies  Decreased need for sleep - denies  Grandiosity - denies  Racing thoughts - denies  Impulsivity - denies  Irritability- denies  Increased energy - denies  Distractibility - denies  Increase in goal-directed activity or PMA- denies    Symptoms of SAVI - decreasing slowly  Symptoms of Panic Disorder- No  Symptoms of PTSD - decreasing slowly        Overall progress: Patient is showing mild improvement         Psychotherapy:  Target symptoms: depression, anxiety , psychosis  Why chosen therapy is appropriate versus another modality: relevant to diagnosis, patient responds to this modality, evidence based practice  Outcome monitoring methods: self-report, observation  Therapeutic intervention type: insight oriented psychotherapy, behavior modifying psychotherapy, supportive psychotherapy, interactive psychotherapy  Topics discussed/themes: difficulty managing affect in interpersonal relationships, building skills sets for symptom management  The patient's response to the intervention is guarded. The patient's progress toward treatment goals is fair, limited, not progressing.   Duration of intervention: 16 minutes.        Medical ROS  General ROS: negative  Ophthalmic ROS: negative  ENT ROS: negative  Allergy and Immunology ROS: negative  Hematological and Lymphatic ROS: negative  Endocrine ROS: negative  Respiratory ROS: no cough, shortness of breath, or wheezing  Cardiovascular ROS: no chest pain or dyspnea on exertion  Gastrointestinal ROS: +abdominal pain, +change in bowel habits, no black or bloody stools (GI Issues are chronic)  Genito-Urinary ROS: no dysuria, trouble voiding, or hematuria  Musculoskeletal ROS: negative  Neurological ROS: no TIA or stroke symptoms  Dermatological ROS: negative         PAST MEDICAL HISTORY   Past Medical History:    Diagnosis Date    Anxiety disorder, unspecified     Bipolar disorder, unspecified     Crohn disease     Hypertension            PSYCHOTROPIC MEDICATIONS   Scheduled Meds:   dicyclomine  10 mg Oral TID    EScitalopram oxalate  10 mg Oral Daily    gabapentin  100 mg Oral TID    metoprolol tartrate  25 mg Oral BID    QUEtiapine  100 mg Oral BID    rOPINIRole  0.5 mg Oral QHS     Continuous Infusions:  PRN Meds:.  Current Facility-Administered Medications:     acetaminophen, 650 mg, Oral, Q6H PRN    aluminum-magnesium hydroxide-simethicone, 30 mL, Oral, Q6H PRN    benzonatate, 100 mg, Oral, TID PRN    benztropine mesylate, 2 mg, Intramuscular, Q8H PRN    hydrOXYzine pamoate, 50 mg, Oral, Q6H PRN    ketorolac, 10 mg, Oral, Q6H PRN    loperamide, 2 mg, Oral, PRN    nicotine, 1 patch, Transdermal, Daily PRN    OLANZapine, 10 mg, Oral, Q8H PRN **AND** OLANZapine, 10 mg, Intramuscular, Q8H PRN    ondansetron, 4 mg, Oral, Q8H PRN    promethazine, 25 mg, Oral, Q6H PRN        EXAMINATION    VITALS   Vitals:    02/07/25 1913 02/08/25 0743 02/08/25 2000 02/09/25 0729   BP: (!) 150/73 (!) 155/73 (!) 196/87 (!) 173/86   BP Location: Left arm Left arm  Left arm   Patient Position: Sitting Sitting  Sitting   Pulse: 79 76 70 76   Resp: 20 18 20 18   Temp: 99.3 °F (37.4 °C) 97.9 °F (36.6 °C) 98.7 °F (37.1 °C) 97.7 °F (36.5 °C)   TempSrc: Oral Oral  Oral   SpO2: 96% 98%  98%   Weight:       Height:           Body mass index is 27.93 kg/m².      CONSTITUTIONAL  General Appearance: unremarkable, age appropriate, obese     MUSCULOSKELETAL  Muscle Strength and Tone:no dyskinesia, no dystonia, no tremor, no tic  Abnormal Involuntary Movements: No  Gait and Station: non-ataxic     PSYCHIATRIC   Level of Consciousness: awake and alert   Orientation: person, place, time, and situation  Grooming: Disheveled and Hospital garb  Psychomotor Behavior: Cooperative, less agitated, eye contact normal, no PMA/R  Speech: normal tone, normal rate,  "normal pitch, normal volume, spontaneous; stutters at times  Language: grossly intact, able to name, able to repeat  Mood: "Better"  Affect: anxious but improving, I angry  Thought Process: linear, logical  Associations: intact   Thought Content: denies SI, denies HI, and no delusions  Perceptions:no AH and no VH  Memory: Able to recall past events, Remote intact, and Recent intact  Attention:Normal and Attends to interview without distraction  Fund of Knowledge: Aware of current events and Vocabulary appropriate   Estimate if Intelligence:  Average based on work/education history, vocabulary and mental status exam  Insight: intact, has awareness of illness- fair  Judgment: behavior is adequate to circumstances, age appropriate- fair       DIAGNOSTIC TESTING   Laboratory Results  No results found for this or any previous visit (from the past 24 hours).          MEDICAL DECISION MAKING      ASSESSMENT:   MDD, recurrent, severe with psychotic features  SAVI  PTSD     Psychosocial stressors     Cannabis Abuse     Chrons disease  HTN  Chronic pain           PROBLEM LIST AND MANAGEMENT PLANS     Depression with psychosis: pt counseled  -start trial of Lexapro at 5 mg po q day- increase to/continue at 10 mg po q day  -start trial of adjunctvie Serqouel 25 mg po BID- increase to 50 mg po BID- increase to 100 mg po BID today- Continue current dose,         Stop home cymbalta and zyprexa (ineffective)  -check CT head given new onset psychosis- no concerning abnormalities noted      SAVI: pt counseled  -meds as above  -started/continue vistaril prn  -gabapentin as below     PTSD: pt counseled  -meds as above  -consider trial of prazosin     Psychosocial stressors: pt counseled  -SW consulted to assist with resources     Cannabis Abuse: pt counseled     Chrons disease: pt counseled  -med consulted     HTN: pt counseled  -med consulted     Chronic pain: pt counseled  -med consulted  -start trila of gabapentin 100 mg po " TID      Discussed diagnosis, risks and benefits of proposed treatment vs alternative treatments vs no treatment, potential side effects of these treatments and the inherent unpredictability of treatment. The patient expresses understanding of the above and displays the capacity to agree with this treatment given said understanding. Patient also agrees that, currently, the benefits outweigh the risks and would like to pursue/continue treatment at this time.    Any medications being used off-label were discussed with the patient inclusive of the evidence base for the use of the medications and consent was obtained for the off-label use of the medication.       DISCHARGE PLANNING  Expected Disposition Plan: Home or Self Care      NEED FOR CONTINUED HOSPITALIZATION  Psychiatric illness continues to pose a potential threat to life or bodily function, of self or others, thereby requiring the need for continued inpatient psychiatric hospitalization: Yes, due to: significant psychotic thought disorder, aggressive neuroleptic titration, hallucinations, danger to self, gravely disabled, and suicidal ideation, as evidenced by:  Ongoing concerns with SI., Ongoing concerns with grave disability with patient unable to perform basic feeding, hygiene and dressing activities without significant constant support., and Ongoing concerns with perceptual aberrancy and paranoid persecutory delusions leading to potential harm of self or others.    Protective inpatient pyschiatric hospitalization required while a safe disposition plan is enacted: Yes    Patient stabilized and ready for discharge from inpatient psychiatric unit: No        STAFF:   Pablo Cooper MD  Psychiatry

## 2025-02-10 PROCEDURE — 25000003 PHARM REV CODE 250: Performed by: PSYCHIATRY & NEUROLOGY

## 2025-02-10 PROCEDURE — 25000003 PHARM REV CODE 250: Performed by: NURSE PRACTITIONER

## 2025-02-10 PROCEDURE — 11400000 HC PSYCH PRIVATE ROOM

## 2025-02-10 RX ORDER — QUETIAPINE FUMARATE 100 MG/1
200 TABLET, FILM COATED ORAL NIGHTLY
Status: DISCONTINUED | OUTPATIENT
Start: 2025-02-10 | End: 2025-02-11 | Stop reason: HOSPADM

## 2025-02-10 RX ORDER — QUETIAPINE FUMARATE 100 MG/1
100 TABLET, FILM COATED ORAL DAILY
Status: DISCONTINUED | OUTPATIENT
Start: 2025-02-11 | End: 2025-02-11 | Stop reason: HOSPADM

## 2025-02-10 RX ADMIN — DICYCLOMINE HYDROCHLORIDE 10 MG: 10 CAPSULE ORAL at 02:02

## 2025-02-10 RX ADMIN — KETOROLAC TROMETHAMINE 10 MG: 10 TABLET, FILM COATED ORAL at 10:02

## 2025-02-10 RX ADMIN — DICYCLOMINE HYDROCHLORIDE 10 MG: 10 CAPSULE ORAL at 08:02

## 2025-02-10 RX ADMIN — QUETIAPINE FUMARATE 100 MG: 100 TABLET ORAL at 08:02

## 2025-02-10 RX ADMIN — HYDROXYZINE PAMOATE 50 MG: 50 CAPSULE ORAL at 10:02

## 2025-02-10 RX ADMIN — GABAPENTIN 100 MG: 100 CAPSULE ORAL at 02:02

## 2025-02-10 RX ADMIN — ACETAMINOPHEN 650 MG: 325 TABLET ORAL at 09:02

## 2025-02-10 RX ADMIN — QUETIAPINE FUMARATE 200 MG: 100 TABLET ORAL at 08:02

## 2025-02-10 RX ADMIN — GABAPENTIN 100 MG: 100 CAPSULE ORAL at 08:02

## 2025-02-10 RX ADMIN — ACETAMINOPHEN 650 MG: 325 TABLET ORAL at 02:02

## 2025-02-10 RX ADMIN — ROPINIROLE HYDROCHLORIDE 0.5 MG: 0.25 TABLET, FILM COATED ORAL at 08:02

## 2025-02-10 RX ADMIN — METOPROLOL TARTRATE 25 MG: 25 TABLET, FILM COATED ORAL at 08:02

## 2025-02-10 RX ADMIN — KETOROLAC TROMETHAMINE 10 MG: 10 TABLET, FILM COATED ORAL at 06:02

## 2025-02-10 RX ADMIN — LOPERAMIDE HYDROCHLORIDE 2 MG: 2 CAPSULE ORAL at 08:02

## 2025-02-10 RX ADMIN — ESCITALOPRAM OXALATE 10 MG: 10 TABLET ORAL at 08:02

## 2025-02-10 NOTE — PROGRESS NOTES
"PSYCHIATRY DAILY INPATIENT PROGRESS NOTE  SUBSEQUENT HOSPITAL VISIT    ENCOUNTER DATE: 2/10/2025  SITE: [facility]    DATE OF ADMISSION: 2/2/2025 11:46 PM  LENGTH OF STAY: 8 days      CHIEF COMPLAINT   Charline Greenfield is a 64 y.o. female, seen during daily fisher rounds on the inpatient unit.  Charline Greenfield presented with the chief complaint of mood disturbance/psychosis, "I've been depressed."       The patient was seen and examined. The chart was reviewed.     Reviewed notes from Rns and NP and labs from the last 24 hours.    The patient's case was discussed with the treatment team/care providers today including Rns, CTRS, NP, and SW    Staff reports no behavioral or management issues.     The patient has been compliant with treatment.      Subjective 02/10/2025    Patient reports mood is "great", however states she is also feeling anxious due to the actions of another patient on the unit. Reports poor sleep last night due to back pain. Rate of speech appears to be normalizing, decreased lability noted. She remains anxious and preoccupid with upcoming bowel imaging/surgery. Continues to endorse some mild lethargy during the day which she feels is associated with seroquel. Discussed potentially switching to XR QHS formulation on discharge (not available on hospital formulary) to which she is amenable.    Pt may be candidate for discharge within 24-48hrs.     Of note, patient reports bowel movement yesterday after milk of mag.               Psychiatric ROS (observed, reported, or endorsed/denied):  Depressed mood - decreasing slowly  Interest/pleasure/anhedonia: decreasing slowly  Guilt/hopelessness/worthlessness - decreasing slowly  Changes in Sleep - decreasing slowly  Changes in Appetite - decreasing slowly  Changes in Concentration - decreasing slowly  Changes in Energy - decreasing slowly  PMA/R- denies  Suicidal- active/passive ideations - variable  Homicidal ideations: active/passive ideations - " No    Hallucinations - denies  Delusions - denies  Disorganized behavior - denies  Disorganized speech - denies  Negative symptoms - denies    Elevated mood - denies  Decreased need for sleep - denies  Grandiosity - denies  Racing thoughts - denies  Impulsivity - denies  Irritability- denies  Increased energy - denies  Distractibility - denies  Increase in goal-directed activity or PMA- denies    Symptoms of SAVI - decreasing slowly  Symptoms of Panic Disorder- No  Symptoms of PTSD - decreasing slowly        Overall progress: Patient is showing mild improvement         Psychotherapy:  Target symptoms: depression, anxiety , psychosis  Why chosen therapy is appropriate versus another modality: relevant to diagnosis, patient responds to this modality, evidence based practice  Outcome monitoring methods: self-report, observation  Therapeutic intervention type: insight oriented psychotherapy, behavior modifying psychotherapy, supportive psychotherapy, interactive psychotherapy  Topics discussed/themes: difficulty managing affect in interpersonal relationships, building skills sets for symptom management  The patient's response to the intervention is guarded. The patient's progress toward treatment goals is fair.   Duration of intervention: 16 minutes.        Medical ROS  General ROS: negative  Ophthalmic ROS: negative  ENT ROS: negative  Allergy and Immunology ROS: negative  Hematological and Lymphatic ROS: negative  Endocrine ROS: negative  Respiratory ROS: no cough, shortness of breath, or wheezing  Cardiovascular ROS: no chest pain or dyspnea on exertion  Gastrointestinal ROS: +abdominal pain, +change in bowel habits, no black or bloody stools (GI Issues are chronic)  Genito-Urinary ROS: no dysuria, trouble voiding, or hematuria  Musculoskeletal ROS: negative  Neurological ROS: no TIA or stroke symptoms  Dermatological ROS: negative         PAST MEDICAL HISTORY   Past Medical History:   Diagnosis Date    Anxiety  disorder, unspecified     Bipolar disorder, unspecified     Crohn disease     Hypertension            PSYCHOTROPIC MEDICATIONS   Scheduled Meds:   dicyclomine  10 mg Oral TID    EScitalopram oxalate  10 mg Oral Daily    gabapentin  100 mg Oral TID    metoprolol tartrate  25 mg Oral BID    QUEtiapine  100 mg Oral BID    rOPINIRole  0.5 mg Oral QHS     Continuous Infusions:  PRN Meds:.  Current Facility-Administered Medications:     acetaminophen, 650 mg, Oral, Q6H PRN    aluminum-magnesium hydroxide-simethicone, 30 mL, Oral, Q6H PRN    benzonatate, 100 mg, Oral, TID PRN    benztropine mesylate, 2 mg, Intramuscular, Q8H PRN    hydrOXYzine pamoate, 50 mg, Oral, Q6H PRN    ketorolac, 10 mg, Oral, Q6H PRN    loperamide, 2 mg, Oral, PRN    magnesium hydroxide 400 mg/5 ml, 30 mL, Oral, Daily PRN    nicotine, 1 patch, Transdermal, Daily PRN    OLANZapine, 10 mg, Oral, Q8H PRN **AND** OLANZapine, 10 mg, Intramuscular, Q8H PRN    ondansetron, 4 mg, Oral, Q8H PRN    promethazine, 25 mg, Oral, Q6H PRN        EXAMINATION    VITALS   Vitals:    02/08/25 2000 02/09/25 0729 02/09/25 1911 02/10/25 0723   BP: (!) 196/87 (!) 173/86 (!) 154/81 (!) 144/90   BP Location:  Left arm Left arm Left arm   Patient Position:  Sitting Sitting Sitting   Pulse: 70 76 66 66   Resp: 20 18 20 18   Temp: 98.7 °F (37.1 °C) 97.7 °F (36.5 °C) 98.9 °F (37.2 °C) 98.3 °F (36.8 °C)   TempSrc:  Oral Oral Oral   SpO2:  98% 99% 97%   Weight:       Height:           Body mass index is 27.93 kg/m².      CONSTITUTIONAL  General Appearance: unremarkable, age appropriate, obese     MUSCULOSKELETAL  Muscle Strength and Tone:no dyskinesia, no dystonia, no tremor, no tic  Abnormal Involuntary Movements: No  Gait and Station: non-ataxic     PSYCHIATRIC   Level of Consciousness: awake and alert   Orientation: person, place, time, and situation  Grooming: Disheveled and Hospital garb  Psychomotor Behavior: Cooperative, less agitated, eye contact normal, no PMA/R  Speech:  "normal tone, normal rate, normal pitch, normal volume, spontaneous; stutters at times  Language: grossly intact, able to name, able to repeat  Mood: "Better"  Affect: anxious but improving,   Thought Process: linear, logical  Associations: intact   Thought Content: denies SI, denies HI, and no delusions  Perceptions:no AH and no VH  Memory: Able to recall past events, Remote intact, and Recent intact  Attention:Normal and Attends to interview without distraction  Fund of Knowledge: Aware of current events and Vocabulary appropriate   Estimate if Intelligence:  Average based on work/education history, vocabulary and mental status exam  Insight: intact, has awareness of illness- fair  Judgment: behavior is adequate to circumstances, age appropriate- fair       DIAGNOSTIC TESTING   Laboratory Results  No results found for this or any previous visit (from the past 24 hours).          MEDICAL DECISION MAKING      ASSESSMENT:   MDD, recurrent, severe with psychotic features  SAVI  PTSD     Psychosocial stressors     Cannabis Abuse     Chrons disease  HTN  Chronic pain           PROBLEM LIST AND MANAGEMENT PLANS     Depression with psychosis: pt counseled  -start trial of Lexapro at 5 mg po q day- increase to/continue at 10 mg po q day  -start trial of adjunctvie Serqouel 25 mg po BID- increase to 50 mg po BID- increase to 100 mg po BID today- Continue current dose-Increase PM dose to 200 mg         Stop home cymbalta and zyprexa (ineffective)  -check CT head given new onset psychosis- no concerning abnormalities noted      SAVI: pt counseled  -meds as above  -started/continue vistaril prn  -gabapentin as below     PTSD: pt counseled  -meds as above  -consider trial of prazosin     Psychosocial stressors: pt counseled  -SW consulted to assist with resources     Cannabis Abuse: pt counseled     Chrons disease: pt counseled  -med consulted     HTN: pt counseled  -med consulted     Chronic pain: pt counseled  -med " consulted  -start trila of gabapentin 100 mg po TID      Discussed diagnosis, risks and benefits of proposed treatment vs alternative treatments vs no treatment, potential side effects of these treatments and the inherent unpredictability of treatment. The patient expresses understanding of the above and displays the capacity to agree with this treatment given said understanding. Patient also agrees that, currently, the benefits outweigh the risks and would like to pursue/continue treatment at this time.    Any medications being used off-label were discussed with the patient inclusive of the evidence base for the use of the medications and consent was obtained for the off-label use of the medication.       DISCHARGE PLANNING  Expected Disposition Plan: Home or Self Care      NEED FOR CONTINUED HOSPITALIZATION  Psychiatric illness continues to pose a potential threat to life or bodily function, of self or others, thereby requiring the need for continued inpatient psychiatric hospitalization: Yes, due to: significant psychotic thought disorder, aggressive neuroleptic titration, hallucinations, danger to self, gravely disabled, and suicidal ideation, as evidenced by:  Ongoing concerns with SI., Ongoing concerns with grave disability with patient unable to perform basic feeding, hygiene and dressing activities without significant constant support., and Ongoing concerns with perceptual aberrancy and paranoid persecutory delusions leading to potential harm of self or others.    Protective inpatient pyschiatric hospitalization required while a safe disposition plan is enacted: Yes    Patient stabilized and ready for discharge from inpatient psychiatric unit: No        STAFF:   Sarthak Fabian NP  Psychiatry

## 2025-02-10 NOTE — NURSING
Pt is cooperative, noted to being anxious this am. Med compliant. Pt requested pain med due to lower back pain, PRNs given. Pt is noted to eat meals and snacks in dining area. Pt spends most the day in room. PT is noted to socialize with select peers. Will continue to monitor for safety.

## 2025-02-10 NOTE — PLAN OF CARE
Problem: Adult Behavioral Health Plan of Care  Goal: Plan of Care Review  Outcome: Progressing  Goal: Patient-Specific Goal (Individualization)  Outcome: Progressing  Goal: Adheres to Safety Considerations for Self and Others  Outcome: Progressing  Goal: Absence of New-Onset Illness or Injury  Outcome: Progressing  Goal: Optimized Coping Skills in Response to Life Stressors  Outcome: Progressing  Goal: Develops/Participates in Therapeutic [place] to Support Successful Transition  Outcome: Progressing  Goal: Rounds/Family Conference  Outcome: Progressing     Problem: Anxiety Signs/Symptoms  Goal: Optimized Energy Level (Anxiety Signs/Symptoms)  Outcome: Progressing  Goal: Improved Somatic Symptoms (Anxiety Signs/Symptoms)  Outcome: Progressing     Problem: Depressive Signs/Symptoms  Goal: Increased Participation and Engagement (Depressive Signs/Symptoms)  Outcome: Progressing  Goal: Improved Mood Symptoms (Depressive Signs/Symptoms)  Outcome: Progressing     Problem: Psychotic Signs/Symptoms  Goal: Optimal Cognitive Function (Psychotic Signs/Symptoms)  Outcome: Progressing  Goal: Increased Participation and Engagement (Psychotic Signs/Symptoms)  Outcome: Progressing     Problem: Fall Injury Risk  Goal: Absence of Fall and Fall-Related Injury  Outcome: Progressing     Problem: Excessive Substance Use  Goal: Optimized Energy Level (Excessive Substance Use)  Outcome: Progressing  Goal: Improved Behavioral Control (Excessive Substance Use)  Outcome: Progressing

## 2025-02-11 VITALS
OXYGEN SATURATION: 96 % | HEART RATE: 71 BPM | SYSTOLIC BLOOD PRESSURE: 123 MMHG | BODY MASS INDEX: 27.77 KG/M2 | WEIGHT: 162.69 LBS | TEMPERATURE: 98 F | HEIGHT: 64 IN | DIASTOLIC BLOOD PRESSURE: 83 MMHG | RESPIRATION RATE: 20 BRPM

## 2025-02-11 PROCEDURE — 25000003 PHARM REV CODE 250: Performed by: NURSE PRACTITIONER

## 2025-02-11 PROCEDURE — 25000003 PHARM REV CODE 250: Performed by: PSYCHIATRY & NEUROLOGY

## 2025-02-11 RX ORDER — HYDROXYZINE PAMOATE 50 MG/1
50 CAPSULE ORAL EVERY 6 HOURS PRN
Qty: 90 CAPSULE | Refills: 0 | Status: SHIPPED | OUTPATIENT
Start: 2025-02-11

## 2025-02-11 RX ORDER — IBUPROFEN 200 MG
1 TABLET ORAL DAILY PRN
Qty: 30 PATCH | Refills: 0 | Status: SHIPPED | OUTPATIENT
Start: 2025-02-11

## 2025-02-11 RX ORDER — GABAPENTIN 100 MG/1
100 CAPSULE ORAL 3 TIMES DAILY
Qty: 90 CAPSULE | Refills: 0 | Status: SHIPPED | OUTPATIENT
Start: 2025-02-11 | End: 2026-02-11

## 2025-02-11 RX ORDER — ESCITALOPRAM OXALATE 10 MG/1
10 TABLET ORAL DAILY
Qty: 30 TABLET | Refills: 0 | Status: SHIPPED | OUTPATIENT
Start: 2025-02-12 | End: 2026-02-12

## 2025-02-11 RX ORDER — QUETIAPINE FUMARATE 200 MG/1
200 TABLET, FILM COATED ORAL NIGHTLY
Qty: 30 TABLET | Refills: 0 | Status: SHIPPED | OUTPATIENT
Start: 2025-02-11 | End: 2026-02-11

## 2025-02-11 RX ORDER — QUETIAPINE FUMARATE 100 MG/1
100 TABLET, FILM COATED ORAL DAILY
Qty: 30 TABLET | Refills: 0 | Status: SHIPPED | OUTPATIENT
Start: 2025-02-12 | End: 2026-02-12

## 2025-02-11 RX ADMIN — ACETAMINOPHEN 650 MG: 325 TABLET ORAL at 01:02

## 2025-02-11 RX ADMIN — ACETAMINOPHEN 650 MG: 325 TABLET ORAL at 03:02

## 2025-02-11 RX ADMIN — METOPROLOL TARTRATE 25 MG: 25 TABLET, FILM COATED ORAL at 08:02

## 2025-02-11 RX ADMIN — GABAPENTIN 100 MG: 100 CAPSULE ORAL at 08:02

## 2025-02-11 RX ADMIN — QUETIAPINE FUMARATE 100 MG: 100 TABLET ORAL at 08:02

## 2025-02-11 RX ADMIN — ACETAMINOPHEN 650 MG: 325 TABLET ORAL at 08:02

## 2025-02-11 RX ADMIN — ONDANSETRON 4 MG: 4 TABLET, ORALLY DISINTEGRATING ORAL at 12:02

## 2025-02-11 RX ADMIN — LOPERAMIDE HYDROCHLORIDE 2 MG: 2 CAPSULE ORAL at 03:02

## 2025-02-11 RX ADMIN — ESCITALOPRAM OXALATE 10 MG: 10 TABLET ORAL at 08:02

## 2025-02-11 RX ADMIN — GABAPENTIN 100 MG: 100 CAPSULE ORAL at 02:02

## 2025-02-11 RX ADMIN — DICYCLOMINE HYDROCHLORIDE 10 MG: 10 CAPSULE ORAL at 08:02

## 2025-02-11 RX ADMIN — DICYCLOMINE HYDROCHLORIDE 10 MG: 10 CAPSULE ORAL at 02:02

## 2025-02-11 NOTE — NURSING
Patient has met all criteria to be discharged today. Patient was explained all discharge instructions and the Patient verbalized an understanding of those instructions. Patient was returned all personal belongings upon departure. Patient was escorted off the unit and out of the hospital by a staff member. Patient was given a [facility] Patient Education for Act 737.

## 2025-02-11 NOTE — PLAN OF CARE
POC reviewed this shift and is ongoing.  Pt withdrawn to her room, cooperative with staff and med compliant with no adverse reactions. Pt came to nurse station requesting and was admin tylenol prn for pain in her side which she states feels like a pull  muscle and will see her PCP on d/c about it. Later patient returned to nurse station and requested and was admin prn Zofran for nausea. Will continue to monitor for changes and safety.

## 2025-02-11 NOTE — PLAN OF CARE
02/11/25 1132   Medicare Message   Important Message from Medicare regarding Discharge Appeal Rights Explained to patient/caregiver;Given to patient/caregiver;Signed/date by patient/caregiver;Other (comments)   Date IMM was signed 02/11/25   Time IMM was signed 0933
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
